# Patient Record
Sex: FEMALE | Race: WHITE
[De-identification: names, ages, dates, MRNs, and addresses within clinical notes are randomized per-mention and may not be internally consistent; named-entity substitution may affect disease eponyms.]

---

## 2018-02-28 ENCOUNTER — HOSPITAL ENCOUNTER (INPATIENT)
Dept: HOSPITAL 17 - NEPE | Age: 63
LOS: 8 days | Discharge: HOSPICE | DRG: 974 | End: 2018-03-08
Attending: HOSPITALIST | Admitting: INTERNAL MEDICINE
Payer: MEDICARE

## 2018-02-28 VITALS
DIASTOLIC BLOOD PRESSURE: 58 MMHG | RESPIRATION RATE: 27 BRPM | HEART RATE: 130 BPM | SYSTOLIC BLOOD PRESSURE: 113 MMHG | OXYGEN SATURATION: 94 %

## 2018-02-28 VITALS
RESPIRATION RATE: 23 BRPM | HEART RATE: 102 BPM | SYSTOLIC BLOOD PRESSURE: 101 MMHG | DIASTOLIC BLOOD PRESSURE: 62 MMHG | TEMPERATURE: 98.7 F | OXYGEN SATURATION: 96 %

## 2018-02-28 VITALS — BODY MASS INDEX: 29.21 KG/M2 | WEIGHT: 158.73 LBS | HEIGHT: 62 IN

## 2018-02-28 VITALS — OXYGEN SATURATION: 93 %

## 2018-02-28 VITALS
HEART RATE: 141 BPM | DIASTOLIC BLOOD PRESSURE: 80 MMHG | RESPIRATION RATE: 29 BRPM | SYSTOLIC BLOOD PRESSURE: 137 MMHG | TEMPERATURE: 100.7 F

## 2018-02-28 VITALS — OXYGEN SATURATION: 96 %

## 2018-02-28 VITALS — HEART RATE: 104 BPM

## 2018-02-28 VITALS
SYSTOLIC BLOOD PRESSURE: 107 MMHG | DIASTOLIC BLOOD PRESSURE: 61 MMHG | HEART RATE: 109 BPM | RESPIRATION RATE: 34 BRPM | OXYGEN SATURATION: 92 % | TEMPERATURE: 99.6 F

## 2018-02-28 VITALS — OXYGEN SATURATION: 97 %

## 2018-02-28 VITALS — OXYGEN SATURATION: 98 %

## 2018-02-28 VITALS — HEART RATE: 96 BPM

## 2018-02-28 VITALS — OXYGEN SATURATION: 95 %

## 2018-02-28 DIAGNOSIS — E87.0: ICD-10-CM

## 2018-02-28 DIAGNOSIS — I25.10: ICD-10-CM

## 2018-02-28 DIAGNOSIS — Z88.5: ICD-10-CM

## 2018-02-28 DIAGNOSIS — G62.9: ICD-10-CM

## 2018-02-28 DIAGNOSIS — B19.20: ICD-10-CM

## 2018-02-28 DIAGNOSIS — I69.351: ICD-10-CM

## 2018-02-28 DIAGNOSIS — A41.9: Primary | ICD-10-CM

## 2018-02-28 DIAGNOSIS — I65.22: ICD-10-CM

## 2018-02-28 DIAGNOSIS — F32.9: ICD-10-CM

## 2018-02-28 DIAGNOSIS — Z85.3: ICD-10-CM

## 2018-02-28 DIAGNOSIS — I10: ICD-10-CM

## 2018-02-28 DIAGNOSIS — B20: ICD-10-CM

## 2018-02-28 DIAGNOSIS — J96.01: ICD-10-CM

## 2018-02-28 DIAGNOSIS — I25.2: ICD-10-CM

## 2018-02-28 DIAGNOSIS — J44.0: ICD-10-CM

## 2018-02-28 DIAGNOSIS — Z66: ICD-10-CM

## 2018-02-28 DIAGNOSIS — Z88.6: ICD-10-CM

## 2018-02-28 DIAGNOSIS — N17.9: ICD-10-CM

## 2018-02-28 DIAGNOSIS — E83.39: ICD-10-CM

## 2018-02-28 DIAGNOSIS — R65.20: ICD-10-CM

## 2018-02-28 DIAGNOSIS — J11.08: ICD-10-CM

## 2018-02-28 DIAGNOSIS — G93.41: ICD-10-CM

## 2018-02-28 DIAGNOSIS — E11.9: ICD-10-CM

## 2018-02-28 DIAGNOSIS — E66.01: ICD-10-CM

## 2018-02-28 DIAGNOSIS — I48.2: ICD-10-CM

## 2018-02-28 DIAGNOSIS — E78.5: ICD-10-CM

## 2018-02-28 DIAGNOSIS — I69.320: ICD-10-CM

## 2018-02-28 DIAGNOSIS — J15.9: ICD-10-CM

## 2018-02-28 DIAGNOSIS — Z51.5: ICD-10-CM

## 2018-02-28 DIAGNOSIS — E87.6: ICD-10-CM

## 2018-02-28 DIAGNOSIS — J44.1: ICD-10-CM
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
## 2018-02-28 DIAGNOSIS — F17.210: ICD-10-CM

## 2018-02-28 LAB
ALBUMIN SERPL-MCNC: 2.8 GM/DL (ref 3.4–5)
ALP SERPL-CCNC: 109 U/L (ref 45–117)
ALT SERPL-CCNC: 177 U/L (ref 10–53)
AST SERPL-CCNC: 129 U/L (ref 15–37)
BASOPHILS # BLD AUTO: 0 TH/MM3 (ref 0–0.2)
BASOPHILS NFR BLD: 0.3 % (ref 0–2)
BILIRUB SERPL-MCNC: 0.6 MG/DL (ref 0.2–1)
BUN SERPL-MCNC: 46 MG/DL (ref 7–18)
CALCIUM SERPL-MCNC: 8.2 MG/DL (ref 8.5–10.1)
CHLORIDE SERPL-SCNC: 109 MEQ/L (ref 98–107)
CREAT SERPL-MCNC: 1.48 MG/DL (ref 0.5–1)
EOSINOPHIL # BLD: 0 TH/MM3 (ref 0–0.4)
EOSINOPHIL NFR BLD: 0.2 % (ref 0–4)
ERYTHROCYTE [DISTWIDTH] IN BLOOD BY AUTOMATED COUNT: 17.2 % (ref 11.6–17.2)
GFR SERPLBLD BASED ON 1.73 SQ M-ARVRAT: 35 ML/MIN (ref 89–?)
GLUCOSE SERPL-MCNC: 162 MG/DL (ref 74–106)
HCO3 BLD-SCNC: 16.8 MEQ/L (ref 21–32)
HCT VFR BLD CALC: 44.3 % (ref 35–46)
HGB BLD-MCNC: 15.3 GM/DL (ref 11.6–15.3)
LYMPHOCYTES # BLD AUTO: 0.8 TH/MM3 (ref 1–4.8)
LYMPHOCYTES NFR BLD AUTO: 6.7 % (ref 9–44)
LYMPHOCYTES: 8 % (ref 9–44)
MAGNESIUM SERPL-MCNC: 1.2 MG/DL (ref 1.5–2.5)
MCH RBC QN AUTO: 32.1 PG (ref 27–34)
MCHC RBC AUTO-ENTMCNC: 34.4 % (ref 32–36)
MCV RBC AUTO: 93.1 FL (ref 80–100)
MONOCYTE #: 0.6 TH/MM3 (ref 0–0.9)
MONOCYTES NFR BLD: 5.2 % (ref 0–8)
MONOCYTES: 4 % (ref 0–8)
NEUTROPHILS # BLD AUTO: 10.7 TH/MM3 (ref 1.8–7.7)
NEUTROPHILS NFR BLD AUTO: 87.6 % (ref 16–70)
NEUTS BAND # BLD MANUAL: 10.7 TH/MM3 (ref 1.8–7.7)
NEUTS BAND NFR BLD: 18 % (ref 0–6)
NEUTS SEG NFR BLD MANUAL: 70 % (ref 16–70)
PLATELET # BLD: 170 TH/MM3 (ref 150–450)
PMV BLD AUTO: 11.5 FL (ref 7–11)
PROT SERPL-MCNC: 7.6 GM/DL (ref 6.4–8.2)
RBC # BLD AUTO: 4.76 MIL/MM3 (ref 4–5.3)
SODIUM SERPL-SCNC: 137 MEQ/L (ref 136–145)
TROPONIN I SERPL-MCNC: 0.03 NG/ML (ref 0.02–0.05)
WBC # BLD AUTO: 12.2 TH/MM3 (ref 4–11)
WBC TOXIC VACUOLES BLD QL SMEAR: PRESENT

## 2018-02-28 PROCEDURE — 36600 WITHDRAWAL OF ARTERIAL BLOOD: CPT

## 2018-02-28 PROCEDURE — 5A09557 ASSISTANCE WITH RESPIRATORY VENTILATION, GREATER THAN 96 CONSECUTIVE HOURS, CONTINUOUS POSITIVE AIRWAY PRESSURE: ICD-10-PCS | Performed by: EMERGENCY MEDICINE

## 2018-02-28 PROCEDURE — 96365 THER/PROPH/DIAG IV INF INIT: CPT

## 2018-02-28 PROCEDURE — 80053 COMPREHEN METABOLIC PANEL: CPT

## 2018-02-28 PROCEDURE — 85027 COMPLETE CBC AUTOMATED: CPT

## 2018-02-28 PROCEDURE — 83605 ASSAY OF LACTIC ACID: CPT

## 2018-02-28 PROCEDURE — 93005 ELECTROCARDIOGRAM TRACING: CPT

## 2018-02-28 PROCEDURE — 86355 B CELLS TOTAL COUNT: CPT

## 2018-02-28 PROCEDURE — 86359 T CELLS TOTAL COUNT: CPT

## 2018-02-28 PROCEDURE — 85025 COMPLETE CBC W/AUTO DIFF WBC: CPT

## 2018-02-28 PROCEDURE — 94003 VENT MGMT INPAT SUBQ DAY: CPT

## 2018-02-28 PROCEDURE — 83735 ASSAY OF MAGNESIUM: CPT

## 2018-02-28 PROCEDURE — 87040 BLOOD CULTURE FOR BACTERIA: CPT

## 2018-02-28 PROCEDURE — 86357 NK CELLS TOTAL COUNT: CPT

## 2018-02-28 PROCEDURE — 86360 T CELL ABSOLUTE COUNT/RATIO: CPT

## 2018-02-28 PROCEDURE — 94640 AIRWAY INHALATION TREATMENT: CPT

## 2018-02-28 PROCEDURE — 71250 CT THORAX DX C-: CPT

## 2018-02-28 PROCEDURE — 71045 X-RAY EXAM CHEST 1 VIEW: CPT

## 2018-02-28 PROCEDURE — 84484 ASSAY OF TROPONIN QUANT: CPT

## 2018-02-28 PROCEDURE — 94002 VENT MGMT INPAT INIT DAY: CPT

## 2018-02-28 PROCEDURE — 82948 REAGENT STRIP/BLOOD GLUCOSE: CPT

## 2018-02-28 PROCEDURE — 82805 BLOOD GASES W/O2 SATURATION: CPT

## 2018-02-28 PROCEDURE — 96375 TX/PRO/DX INJ NEW DRUG ADDON: CPT

## 2018-02-28 PROCEDURE — 76705 ECHO EXAM OF ABDOMEN: CPT

## 2018-02-28 PROCEDURE — 84100 ASSAY OF PHOSPHORUS: CPT

## 2018-02-28 PROCEDURE — 86403 PARTICLE AGGLUT ANTBDY SCRN: CPT

## 2018-02-28 PROCEDURE — 87449 NOS EACH ORGANISM AG IA: CPT

## 2018-02-28 PROCEDURE — 99292 CRITICAL CARE ADDL 30 MIN: CPT

## 2018-02-28 PROCEDURE — 87077 CULTURE AEROBIC IDENTIFY: CPT

## 2018-02-28 PROCEDURE — 87641 MR-STAPH DNA AMP PROBE: CPT

## 2018-02-28 PROCEDURE — 80048 BASIC METABOLIC PNL TOTAL CA: CPT

## 2018-02-28 PROCEDURE — 87186 SC STD MICRODIL/AGAR DIL: CPT

## 2018-02-28 PROCEDURE — 81001 URINALYSIS AUTO W/SCOPE: CPT

## 2018-02-28 PROCEDURE — 87205 SMEAR GRAM STAIN: CPT

## 2018-02-28 PROCEDURE — 85007 BL SMEAR W/DIFF WBC COUNT: CPT

## 2018-02-28 PROCEDURE — 83880 ASSAY OF NATRIURETIC PEPTIDE: CPT

## 2018-02-28 PROCEDURE — 84439 ASSAY OF FREE THYROXINE: CPT

## 2018-02-28 PROCEDURE — 82550 ASSAY OF CK (CPK): CPT

## 2018-02-28 PROCEDURE — 94664 DEMO&/EVAL PT USE INHALER: CPT

## 2018-02-28 PROCEDURE — 76937 US GUIDE VASCULAR ACCESS: CPT

## 2018-02-28 PROCEDURE — 84443 ASSAY THYROID STIM HORMONE: CPT

## 2018-02-28 PROCEDURE — 93306 TTE W/DOPPLER COMPLETE: CPT

## 2018-02-28 RX ADMIN — Medication SCH ML: at 16:14

## 2018-02-28 RX ADMIN — ENOXAPARIN SODIUM SCH MG: 30 INJECTION SUBCUTANEOUS at 16:14

## 2018-02-28 RX ADMIN — TAZOBACTAM SODIUM AND PIPERACILLIN SODIUM SCH MLS/HR: 375; 3 INJECTION, SOLUTION INTRAVENOUS at 22:03

## 2018-02-28 RX ADMIN — OSELTAMIVIR PHOSPHATE SCH MG: 75 CAPSULE ORAL at 21:00

## 2018-02-28 RX ADMIN — CARVEDILOL SCH MG: 12.5 TABLET, FILM COATED ORAL at 21:56

## 2018-02-28 RX ADMIN — IPRATROPIUM BROMIDE AND ALBUTEROL SULFATE SCH AMPULE: .5; 3 SOLUTION RESPIRATORY (INHALATION) at 10:45

## 2018-02-28 RX ADMIN — CHLORHEXIDINE GLUCONATE 0.12% ORAL RINSE SCH ML: 1.2 LIQUID ORAL at 20:00

## 2018-02-28 RX ADMIN — Medication SCH ML: at 21:56

## 2018-02-28 RX ADMIN — INSULIN ASPART SCH: 100 INJECTION, SOLUTION INTRAVENOUS; SUBCUTANEOUS at 21:54

## 2018-02-28 RX ADMIN — IPRATROPIUM BROMIDE AND ALBUTEROL SULFATE SCH AMPULE: .5; 3 SOLUTION RESPIRATORY (INHALATION) at 19:08

## 2018-02-28 RX ADMIN — TAZOBACTAM SODIUM AND PIPERACILLIN SODIUM SCH MLS/HR: 375; 3 INJECTION, SOLUTION INTRAVENOUS at 16:52

## 2018-02-28 RX ADMIN — IPRATROPIUM BROMIDE AND ALBUTEROL SULFATE SCH AMPULE: .5; 3 SOLUTION RESPIRATORY (INHALATION) at 10:54

## 2018-02-28 RX ADMIN — IPRATROPIUM BROMIDE AND ALBUTEROL SULFATE SCH AMPULE: .5; 3 SOLUTION RESPIRATORY (INHALATION) at 23:12

## 2018-02-28 RX ADMIN — LINEZOLID SCH MLS/HR: 600 INJECTION, SOLUTION INTRAVENOUS at 16:14

## 2018-02-28 RX ADMIN — FAMOTIDINE SCH MG: 10 INJECTION, SOLUTION INTRAVENOUS at 16:14

## 2018-02-28 RX ADMIN — IPRATROPIUM BROMIDE AND ALBUTEROL SULFATE SCH AMPULE: .5; 3 SOLUTION RESPIRATORY (INHALATION) at 15:29

## 2018-02-28 RX ADMIN — MONTELUKAST SODIUM SCH MG: 10 TABLET, COATED ORAL at 21:55

## 2018-02-28 RX ADMIN — ATORVASTATIN CALCIUM SCH MG: 80 TABLET, FILM COATED ORAL at 21:56

## 2018-02-28 RX ADMIN — IPRATROPIUM BROMIDE AND ALBUTEROL SULFATE SCH AMPULE: .5; 3 SOLUTION RESPIRATORY (INHALATION) at 10:55

## 2018-02-28 RX ADMIN — INSULIN ASPART SCH: 100 INJECTION, SOLUTION INTRAVENOUS; SUBCUTANEOUS at 16:38

## 2018-02-28 RX ADMIN — AMIODARONE HYDROCHLORIDE SCH MG: 200 TABLET ORAL at 21:54

## 2018-02-28 RX ADMIN — PHENYTOIN SODIUM SCH MLS/HR: 50 INJECTION INTRAMUSCULAR; INTRAVENOUS at 16:29

## 2018-02-28 NOTE — RADRPT
EXAM DATE/TIME:  02/28/2018 10:43 

 

[facility] COMPARISON:     

CHEST SINGLE AP, January 12, 2016, 14:22.

 

                     

INDICATIONS :     

Shortness of breath.

                     

 

MEDICAL HISTORY :     

Hypertension.  Myocardial infarction.  Stroke.   Afib. Diabetes.    

 

SURGICAL HISTORY :     

None.   

 

ENCOUNTER:     

Initial                                        

 

ACUITY:     

1 day      

 

PAIN SCORE:     

0/10

 

LOCATION:     

Bilateral chest 

 

FINDINGS:     

Cardiomegaly with mild interstitial edema without consolidation or pleural effusion.  No pneumothorax
. The portion of the bony skeleton visualized is unremarkable.

 

 

CONCLUSION:     

Mild to moderate congestive failure when compared to 1/12/16

 

 

 

 Lito Salomon MD FACR on February 28, 2018 at 10:56           

Board Certified Radiologist.

 This report was verified electronically.

## 2018-02-28 NOTE — RADRPT
EXAM DATE/TIME:  02/28/2018 13:27 

 

[facility] COMPARISON:     

No previous studies available for comparison.

 

 

INDICATIONS :     

Shortness of breath. 

                      

 

RADIATION DOSE:     

18.63 CTDIvol (mGy) 

 

 

MEDICAL HISTORY :     

Cardiovascular disease. Hypertension.  Asthma

 

SURGICAL HISTORY :      

Hysterectomy.  Shoulder surgery

 

ENCOUNTER:      

Initial

 

ACUITY:      

1 day

 

PAIN SCALE:      

0/10

 

LOCATION:        

chest 

 

TECHNIQUE:      

Volumetric scanning of the chest was performed.  Using automated exposure control and adjustment of t
he mA and/or kV according to patient size, radiation dose was kept as low as reasonably achievable to
 obtain optimal diagnostic quality images.  DICOM format image data is available electronically for r
eview and comparison.  

 

Follow-up recommendations for detected pulmonary nodules are based at a minimum on nodule size and pa
tient risk factors according to Fleischner Society Guidelines.

 

FINDINGS:     

There is patchy airspace disease in both the right and left lungs with groundglass opacity and scatte
red areas of consolidation.

There is compensated cardiomegaly with extensive vascular calcification including the coronaries.  Th
ere is no pericardial effusion.

There is no significant pleural effusion

Small 1.4 cm left adrenal nodule is noted.

Upper abdominal contents otherwise unremarkable.

 

CONCLUSION:     

Patchy airspace disease scattered consolidation both lungs.  No pleural effusion.

 

 

 

 

 Lito Salomon MD FACR on February 28, 2018 at 13:37           

Board Certified Radiologist.

 This report was verified electronically.

## 2018-02-28 NOTE — PD
HPI


Chief Complaint:  Respiratory Distress


Time Seen by Provider:  10:33


Travel History


International Travel<30 days:  No


Contact w/Intl Traveler<30days:  No


Traveled to known affect area:  No





History of Present Illness


HPI


The patient was seen and examined in the presence of the nurse.  This patient 

is sent in by ambulance from the nursing home in critical distress.  She is a 

DNR patient with HIV who is currently on day 5 of Tamiflu for influenza.  She 

is an active smoker at the facility.  She's been coughing up thick mucus.  She 

has a fever of 100.7 at this time.  Symptoms are severe.  Duration of symptoms 

1 week.  However much worse today.  No alleviating factors.  Symptoms 

exacerbated by her smoking and immunocompromise.





PFSH


Past Medical History


Hx Anticoagulant Therapy:  Yes


Arthritis:  No


Asthma:  Yes


Autoimmune Disease:  Yes (HIV +)


Blood Disorders:  No


Anxiety:  Yes


Depression:  Yes


Heart Rhythm Problems:  Yes (MURMUR AFIB)


Cancer:  No


Cardiovascular Problems:  Yes


High Cholesterol:  Yes


Chemotherapy:  No


Congestive Heart Failure:  No


COPD:  No


Cerebrovascular Accident:  Yes


Diabetes:  Yes


Endocrine:  No


Gastrointestinal Disorders:  Yes


GERD:  Yes


Glaucoma:  No


Genitourinary:  No


Headaches:  No


Hepatitis:  Yes (Hep-C)


Hiatal Hernia:  No


Hypertension:  Yes


Immune Disorder:  No


Implanted Vascular Access Dvce:  Yes


Kidney Stones:  No


Musculoskeletal:  Yes ("INJURIES FROM FREQUENT FALLS")


Neurologic:  Yes


Psychiatric:  Yes


Reproductive:  No


Respiratory:  Yes


Migraines:  No


Radiation Therapy:  No


Renal Failure:  No


Seizures:  No


Sickle Cell Disease:  No


Sleep Apnea:  No


Thyroid Disease:  No


Ulcer:  No


Pregnant?:  Not Pregnant





Past Surgical History


Abdominal Surgery:  No


AICD:  No


Body Medical Devices:  RODS IN BILATERAL LEGS FROM ORTHOPEDIC SURGERY AS PER 

SISTER


Cardiac Surgery:  No


Ear Surgery:  No


Endocrine Surgery:  No


Eye Surgery:  Yes (BILATERAL CATARACTS)


Genitourinary Surgery:  No


Gynecologic Surgery:  Yes


Hysterectomy:  Yes


Insulin Pump:  No


Joint Replacement:  No


Oral Surgery:  No


Pacemaker:  No


Thoracic Surgery:  Yes (L MASTECTOMY)


Other Surgery:  Yes (LUMPECTOMY)





Social History


Alcohol Use:  No


Tobacco Use:  No (1 PACK A DAY FOR 30 YEARS)


Substance Use:  No





Allergies-Medications


(Allergen,Severity, Reaction):  


Coded Allergies:  


     acetaminophen (Unverified  Allergy, Severe, GI UPSET, 8/15/17)


     hydrocodone (Unverified  Allergy, Severe, GI UPSET, 8/15/17)


Reported Meds & Prescriptions





Reported Meds & Active Scripts


Active


Xanax 0.25 Mg (Alprazolam) 0.25 Mg Tab 0.25 Mg PO Q8H PRN


Prozac (Fluoxetine HCl) 20 Mg Cap 40 Mg PO DAILY 


Sustiva (Efavirenz) 600 Mg Tab 600 Mg PO HS 


Ecotrin (Aspirin) 81 Mg Tabec 81 Mg PO DAILY 


Epzicom 600/300 (Abacavir/Lamivudine)  Tab 1 Tab PO DAILY 


Cardizem 60 mg tab (Diltiazem HCl) 60 Mg Tab 1 Tab PO QID 


Mag-Ox 400 Mg Tab (Magnesium Oxide) 400 Mg Tab 800 Mg PO TID 


Kcl 20 Meq Tab (Potassium Chloride) 20 Meq Tabcr 20 Meq PO DAILY 


Ultram (Tramadol HCl) 50 Mg Tab 50 Mg PO Q8H PRN


Nexium 24Hr (Esomeprazole Magnesium) 20 Mg Cap 20 Mg PO DAILY 


Crestor (Rosuvastatin Calcium) 40 Mg Tab 40 Mg PO HS 


Cordarone 200 Mg Tab (Amiodarone HCl) 200 Mg Tab 300 Mg PO Q12HR 


Coreg 12.5 mg (Carvedilol) 12.5 Mg Tab 1 Tab PO BID 


Losartan Potassium 50 Mg Tab 50 Mg PO BID 


Hydrodiuril (Hydrochlorothiazide) 25 Mg Tab 25 Mg PO DAILY 


Plavix (Clopidogrel Bisulfate) 75 Mg Tab 75 Mg PO DAILY 


Neurontin (Gabapentin) 800 Mg Tab 800 Mg PO BID 


Singulair (Montelukast Sodium) 10 Mg Tab 10 Mg PO HS 


Reported


Ambien 10 Mg Tab (Zolpidem Tartrate) 10 Mg Tab 10 Mg PO HS 








Review of Systems


General / Constitutional:  Positive: Fever, Chills


Eyes:  No: Visual changes


HENT:  Positive: Congestion, No: Headaches


Cardiovascular:  Positive: Tachycardia, No: Chest Pain or Discomfort


Respiratory:  Positive: Cough, Shortness of Breath, Wheezing


Gastrointestinal:  No: Abdominal Pain


Genitourinary:  No: Dysuria


Musculoskeletal:  No: Pain


Skin:  No Rash


Neurologic:  No: Weakness


Psychiatric:  No: Depression


Endocrine:  No: Polydipsia


Hematologic/Lymphatic:  No: Easy Bruising





Physical Exam


Narrative


GENERAL: Obese 63-year-old woman in profound respiratory distress.


SKIN: Focused skin assessment reveals no rash and nodules. Skin is Warm and dry.


HEAD: Atraumatic. Normocephalic. 


EYES: Pupils equal and round. No scleral icterus. No injection or drainage. 


ENT: No nasal bleeding or discharge.  Mucous membranes pink and moist.


NECK: Trachea midline. No JVD. 


CARDIOVASCULAR: Regular rate and rhythm.  No murmur appreciated.  Tachycardic 

at 140


RESPIRATORY: Positive accessory muscle use.  Diffuse expiratory wheezing and 

rhonchi . Breath sounds equal bilaterally.  Respiratory rate is elevated


GASTROINTESTINAL: Abdomen soft, non-tender, nondistended. Hepatic and splenic 

margins not palpable. 


MUSCULOSKELETAL: No obvious deformities. No clubbing.  No cyanosis.  No edema. 


NEUROLOGICAL: Awake and alert. No obvious cranial nerve deficits.  Motor 

grossly within normal limits. Normal speech.


PSYCHIATRIC: Appropriate mood and affect; insight and judgment poor .





Data


Data


Last Documented VS





Vital Signs








  Date Time  Temp Pulse Resp B/P (MAP) Pulse Ox O2 Delivery O2 Flow Rate FiO2


 


2/28/18 11:42     95   70


 


2/28/18 11:20  130 27 113/58 (76)  BiPAP  


 


2/28/18 10:31       8.00 


 


2/28/18 10:31 100.7       








Orders





 Orders


Complete Blood Count With Diff (2/28/18 10:33)


Comprehensive Metabolic Panel (2/28/18 10:33)


Blood Culture (2/28/18 10:33)


Iv Access Insert/Monitor (2/28/18 10:33)


Electrocardiogram (2/28/18 10:33)


Ecg Monitoring (2/28/18 10:33)


Oximetry (2/28/18 10:33)


Oxygen Administration (2/28/18 10:33)


Chest, Single Ap (2/28/18 10:33)


Sodium Chloride 0.9% Flush (Ns Flush) (2/28/18 10:45)


Methylprednisolone So Succ Inj (Solumedr (2/28/18 10:45)


Albuterol-Ipratropium Neb (Duoneb Neb) (2/28/18 10:45)


Resp Bipap / Cpap Non Invas Vt (2/28/18 10:33)


Piperacil-Tazo 3.375 Gm Premix (Zosyn 3. (2/28/18 10:45)


Lactic Acid (2/28/18 10:42)


Arterial Blood Gas (Abg) (2/28/18 )


B-Type Natriuretic Peptide (2/28/18 12:59)


Ct Thorax/ Chest Wo Iv Contras (2/28/18 )


Admit Order (Ed Use Only) (2/28/18 13:05)


Admit To Inpatient (2/28/18 )


Code Status (2/28/18 13:04)


Vital Signs (Adult) MIKKI.Q1H (2/28/18 13:04)


Activity Bed Rest (2/28/18 13:04)


Cardiac Monitor / Telemetry MIKKI.Q8H (2/28/18 13:04)


Intake + Output MIKKI.Q8H (2/28/18 13:04)


Diet Npo (2/28/18 Lunch)


Sodium Chlor 0.9% 1000 Ml Inj (Ns 1000 M (2/28/18 13:04)


Sodium Chloride 0.9% Flush (Ns Flush) (2/28/18 13:15)


Sodium Chloride 0.9% Flush (Ns Flush) (2/28/18 13:15)


Albuterol-Ipratropium Neb (Duoneb Neb) (2/28/18 16:00)


Albuterol-Ipratropium Neb (Duoneb Neb) (2/28/18 13:15)


Chlorhexidine 0.12% Liq (Peridex 0.12% L (2/28/18 20:00)


Famotidine Inj (Pepcid Inj) (2/28/18 13:15)


Complete Blood Count With Diff (3/1/18 06:00)


Comprehensive Metabolic Panel (3/1/18 06:00)


Troponin I (2/28/18 13:04)


Troponin I (2/28/18 19:04)


Magnesium (Mg) (2/28/18 13:04)


Sputum Culture And Gram Stain (2/28/18 13:04)


Ua Includes Microscopic (2/28/18 13:04)


Chest, Single Ap (3/1/18 06:00)


Resp Bipap / Cpap Non Invas Vt (2/28/18 )


Enoxaparin Inj (Lovenox Inj) (2/28/18 13:15)


^ Initiate Protocol (2/28/18 13:04)


Instruction (2/28/18 13:04)


Notify Md To Reorder (2/28/18 13:15)


Chlorhexidine 2% Cloth (Chlorhexidine 2% (3/1/18 04:00)


Chlorhexidine 2% Cloth (Chlorhexidine 2% (2/28/18 13:15)


Mrsa Pcr Surveillance (2/28/18 13:04)


Inpatient Certification (2/28/18 )


Piperacil-Tazo 4.5 Gm Premix (Zosyn 4.5 (2/28/18 13:15)


Linezolid 600 Mg Premix (Zyvox 600 Mg Pr (2/28/18 13:15)


Oseltamivir (Tamiflu) (2/28/18 21:00)


Consult Infectious Disease (2/28/18 )





Labs





Laboratory Tests








Test


  2/28/18


10:28 2/28/18


11:27


 


White Blood Count 12.2 TH/MM3  


 


Red Blood Count 4.76 MIL/MM3  


 


Hemoglobin 15.3 GM/DL  


 


Hematocrit 44.3 %  


 


Mean Corpuscular Volume 93.1 FL  


 


Mean Corpuscular Hemoglobin 32.1 PG  


 


Mean Corpuscular Hemoglobin


Concent 34.4 % 


  


 


 


Red Cell Distribution Width 17.2 %  


 


Platelet Count 170 TH/MM3  


 


Mean Platelet Volume 11.5 FL  


 


Neutrophils (%) (Auto) 87.6 %  


 


Lymphocytes (%) (Auto) 6.7 %  


 


Monocytes (%) (Auto) 5.2 %  


 


Eosinophils (%) (Auto) 0.2 %  


 


Basophils (%) (Auto) 0.3 %  


 


Neutrophils # (Auto) 10.7 TH/MM3  


 


Lymphocytes # (Auto) 0.8 TH/MM3  


 


Monocytes # (Auto) 0.6 TH/MM3  


 


Eosinophils # (Auto) 0.0 TH/MM3  


 


Basophils # (Auto) 0.0 TH/MM3  


 


CBC Comment AUTO DIFF  


 


Differential Total Cells


Counted 100 


  


 


 


Neutrophils % (Manual) 70 %  


 


Band Neutrophils % 18 %  


 


Lymphocytes % 8 %  


 


Monocytes % 4 %  


 


Neutrophils # (Manual) 10.7 TH/MM3  


 


Differential Comment


  FINAL DIFF


MANUAL 


 


 


Toxic Vacuolation PRESENT  


 


Platelet Estimate NORMAL  


 


Platelet Morphology Comment ENLARGED  


 


Blood Urea Nitrogen 46 MG/DL  


 


Creatinine 1.48 MG/DL  


 


Random Glucose 162 MG/DL  


 


Total Protein 7.6 GM/DL  


 


Albumin 2.8 GM/DL  


 


Calcium Level 8.2 MG/DL  


 


Alkaline Phosphatase 109 U/L  


 


Aspartate Amino Transf


(AST/SGOT) 129 U/L 


  


 


 


Alanine Aminotransferase


(ALT/SGPT) 177 U/L 


  


 


 


Total Bilirubin 0.6 MG/DL  


 


Sodium Level 137 MEQ/L  


 


Potassium Level 3.5 MEQ/L  


 


Chloride Level 109 MEQ/L  


 


Carbon Dioxide Level 16.8 MEQ/L  


 


Anion Gap 11 MEQ/L  


 


Estimat Glomerular Filtration


Rate 35 ML/MIN 


  


 


 


Lactic Acid Level 1.6 mmol/L  


 


Blood Gas Puncture Site  RT RADIAL 


 


Blood Gas Patient Temperature  98.6 


 


Blood Gas HCO3  15 mmol/L 


 


Blood Gas Base Excess  -9.5 mmol/L 


 


Blood Gas Oxygen Saturation  93 % 


 


Arterial Blood pH  7.36 


 


Arterial Blood Partial


Pressure CO2 


  27 mmHg 


 


 


Arterial Blood Partial


Pressure O2 


  72 mmHG 


 


 


Arterial Blood Oxygen Content  20.0 Vol % 


 


Arterial Blood


Carboxyhemoglobin 


  1.0 % 


 


 


Arterial Blood Methemoglobin  0.6 % 


 


Blood Gas Hemoglobin  15.3 G/DL 


 


Oxygen Delivery Device  BiPAP 


 


Blood Gas Ventilator Setting  IPAP15/EPAP5 


 


Blood Gas Inspired Oxygen  70 % 











MDM


Medical Decision Making


Medical Screen Exam Complete:  Yes


Emergency Medical Condition:  Yes


Medical Record Reviewed:  Yes


Differential Diagnosis


Respiratory failure, pneumonia, COPD


Narrative Course


I have reviewed the patient's electronic medical record.  Reviewed her nursing 

home paperwork.  She is a DNR.  Reviewed her labs from January 1035: I have evaluated the patient.  She is in critical respiratory distress.





IV placed


I gave her IV Solu-Medrol and IV Zosyn after 2 blood culture sets obtained


Labs sent


I've ordered an x-ray.  I've initiated BiPAP therapy


We'll honor her DNR and hold off on intubation





11:30: I reviewed her chest x-ray.  Radiologist reading is noted.  However she 

does not have any clinical suspicion of CHF.  No edema or diuretic use.  On the 

other hand she does have pneumonia symptomatology with fever and productive 

cough and immunocompromise





1145: CBC shows minor leukocytosis


Metabolic profile shows azotemia with elevated BUN and creatinine ratio


Get some IV fluid





She is in A. fib with RVR


Not hypotensive at this time





12:30: Another recheck was done.  I reviewed her ABG which shows significant 

hypoxemia.  However her workup breathing is much better on the BiPAP


Heart rate running in the 120s A. fib





1310: I reviewed with intensivist for admission


Patient is critically ill


Critical Care Narrative


Aggregate critical care time was 80 minutes. Time to perform other separately 

billable procedures was not included in the critical care time. My time did not 

include minutes spent treating any other patients simultaneously or on 

activities that did not directly contribute to the patient's treatment.  





The services I provided to this patient were to treat and/or prevent clinically 

significant deterioration that could result in: Cardiopulmonary arrest, 

hypoxemic brain injury, death





I provided critical care services requiring my management, as noted below:


Chart data review, documentation time, medication orders and management, vital 

sign assessments/reviewing monitor data, ordering and reviewing lab tests, 

ordering and interpreting/reviewing x-rays and diagnostic studies, care of the 

patient and discussion of the patient with the admitting physicians.





Sepsis Criteria


SIRS Criteria (2 or more):  Heart rate over 90, RR  > 20 or PaCO2 < 32


Sepsis Criteria (SIRS+source):  Infect source susp/known


Criteria Outcome:  Meets sepsis criteria





Diagnosis





 Primary Impression:  


 Acute hypoxemic respiratory failure


 Additional Impressions:  


 History of HIV infection


 Atrial fibrillation with RVR


 Azotemia





Admitting Information


Admitting Physician Requests:  Admit











Mik Menjivar MD Feb 28, 2018 10:42

## 2018-02-28 NOTE — RADRPT
EXAM DATE/TIME:  02/28/2018 13:48 

 

[facility] COMPARISON:     

CT THORAX W/O CONTRAST, February 28, 2018, 13:27.

 

[facility]PAR[facility] :    

[facility]

 

 

INDICATIONS :     

Abnormal labs.

                     

 

MEDICAL HISTORY :     

Hypercholesterolemia. Hepatitis C.   CVA. MI. Anticoagulant therapy. A.FIb. Murmur. Hypertension. Ast
hma. GERD. Diabetes. HIV.

 

SURGICAL HISTORY :     

Hysterectomy.     Cataracts removal. Left mastectomy. Bilateral leg surgery with hardware. 

 

ENCOUNTER:     

Initial

 

ACUITY:     

1 day

 

PAIN SCORE:     

0/10

 

LOCATION:         

Abdomen. 

                     

MEASUREMENTS:     

 

LIVER:     

19.9 cm length 

 

COMMON DUCT:     

6 mm

 

RIGHT KIDNEY:     

11.6 x 3.9  x 4.2  cm

 

SPLEEN:     

11.7 cm length

 

FINDINGS:     

 

LIVER:     

Mild increased echotexture without focal lesion or ductal dilatation.  Main portal vein is patent.

 

COMMON DUCT:     

No intraluminal mass or stone visualized.  

 

GALLBLADDER:     

There is a single stone visualized in the gallbladder. No wall thickening or pericholecystic fluid is
 present.

 

PANCREAS:     

The visualized portions are within normal limits.  

 

RIGHT KIDNEY:     

No hydronephrosis, stone or mass.  

 

SPLEEN:     

No focal lesion.  

 

CONCLUSION:     

1. Mild increased echotexture of the liver may indicate steatosis.

2. Cholelithiasis.

 

 

 

 Yg Rodarte MD on February 28, 2018 at 14:49           

Board Certified Radiologist.

 This report was verified electronically.

## 2018-02-28 NOTE — PD.ID.CON
History of Present Illness


Service


ID


Consult Requested By


 Dr Ronquillo


Reason for Consult


HIV , PNA


Primary Care Physician


Sid Mendoza MD


Diagnoses:  


History of Present Illness


64 yo female is a very poor historian


History obtained from the chart


She lives in [facility], pt of Dr Mendoza, + chronic tobaccoism, COPD, HIV dz (

pt of Dr Rose, on HAART: Truvadfa+ Norvir only reported on med rec), she 

dpoesnnot know her CD4


SHe presented with diagnosis of influenza in acute resp distress, cough


CT showed b/l patchy  infiltrates 


She is on zosyn, zyvox and tamiflu


She is afebrile with mildly elevated  WBC (12K)


ABG with incerease Aa gradient


MRSA nasal screen +


Lactic acid wnl


Blood clx are Pending


Sputum clx not available (pt not expectorationg)





Review of Systems


Except as stated in HPI:  all other systems reviewed are Neg





Past Family Social History


Allergies:  


Coded Allergies:  


     acetaminophen (Unverified  Allergy, Severe, GI UPSET, 8/15/17)


     hydrocodone (Unverified  Allergy, Severe, GI UPSET, 8/15/17)


Past Medical History


HIV


HCV


breast CA (L)


COPD tobaccoism


NSTEMI


AFib


MOrbid obesity


Past Surgical History


L  mastectomy


ORIF BLE


Active Ordered Medications


Medications where reviewed in EMR


Antibiotics Include:  tamiflu


zosyn


zyvox


Family History


reviewed


non contributory


Social History


+ tobacco


no ETOH


resides in [facility]





Physical Exam


Vital Signs





Vital Signs








  Date Time  Temp Pulse Resp B/P (MAP) Pulse Ox O2 Delivery O2 Flow Rate FiO2


 


2/28/18 19:12     97 BiPAP  50


 


2/28/18 19:11     97   50


 


2/28/18 18:00  104      


 


2/28/18 16:00  109      


 


2/28/18 16:00 99.6 109 34 107/61 (76) 92   


 


2/28/18 15:30     98 Non-Rebreather 12.00 


 


2/28/18 11:42     95   70


 


2/28/18 11:20  130 27 113/58 (76) 94 BiPAP  70


 


2/28/18 10:38     92 BiPAP  70


 


2/28/18 10:32     93   70


 


2/28/18 10:31     93 Nasal Cannula 8.00 


 


2/28/18 10:31 100.7 141 29 137/80 (99)  BiPAP  70








Physical Exam


CONSTITUTIONAL/GENERAL: This is a morbidly obese patient, in no apparent 

distress.


On BIPAP 50%, partial face mask


TUBES/LINES/DRAINS:


SKIN: No jaundice, rashes, or lesions.   Skin temperature appropriate. Not 

diaphoretic. 


BREASTS: mastectomy scar L breast - well healed


HEAD: Atraumatic. Normocephalic.


EYES: Pupils equal and round and reactive. Extraocular motions intact. No 

scleral icterus. No injection or drainage. Fundi not examined.


ENT: Hearing grossly normal. Nose without bleeding or purulent drainage. 

Limited mucosae exam without visible erythema, or lesions.





NECK: Trachea midline. Supple, nontender.  


CARDIOVASCULAR: Regular rate and rhythm without murmurs, gallops, or rubs. No 

JVD. Peripheral pulses symmetric. Perifery well perfused 


RESPIRATORY/CHEST: Symmetric, unlabored respirations. Diffuse b/l to 

auscultation. Breath sounds equal bilaterally.   


GASTROINTESTINAL: Abdomen soft, non-tender, nondistended. No hepato-splenomegaly

, or palpable masses. No guarding. Bowel sounds present.


GENITOURINARY: Without palpable bladder distension. Warner catheter in place.


MUSCULOSKELETAL: Extremities without clubbing, cyanosis, or edema. No joint 

tenderness or effusion noted. No calf tenderness. No mottling or clubbing.


LYMPHATICS: No palpable cervical or supraclavicular adenopathy.


NEUROLOGICAL: Awake and alert. Motor and sensory grossly within normal limits. 

Follows commands. Moves all extremities.


PSYCHIATRIC: No obvious anxiety/depression. no apparent hallucinations or other 

psychotic thought process.


Laboratory





Laboratory Tests








Test


  2/28/18


10:28 2/28/18


11:27 2/28/18


16:00


 


White Blood Count 12.2   


 


Red Blood Count 4.76   


 


Hemoglobin 15.3   


 


Hematocrit 44.3   


 


Mean Corpuscular Volume 93.1   


 


Mean Corpuscular Hemoglobin 32.1   


 


Mean Corpuscular Hemoglobin


Concent 34.4 


  


  


 


 


Red Cell Distribution Width 17.2   


 


Platelet Count 170   


 


Mean Platelet Volume 11.5   


 


Neutrophils (%) (Auto) 87.6   


 


Lymphocytes (%) (Auto) 6.7   


 


Monocytes (%) (Auto) 5.2   


 


Eosinophils (%) (Auto) 0.2   


 


Basophils (%) (Auto) 0.3   


 


Neutrophils # (Auto) 10.7   


 


Lymphocytes # (Auto) 0.8   


 


Monocytes # (Auto) 0.6   


 


Eosinophils # (Auto) 0.0   


 


Basophils # (Auto) 0.0   


 


CBC Comment AUTO DIFF   


 


Differential Total Cells


Counted 100 


  


  


 


 


Neutrophils % (Manual) 70   


 


Band Neutrophils % 18   


 


Lymphocytes % 8   


 


Monocytes % 4   


 


Neutrophils # (Manual) 10.7   


 


Differential Comment


  FINAL DIFF


MANUAL 


  


 


 


Toxic Vacuolation PRESENT   


 


Platelet Estimate NORMAL   


 


Platelet Morphology Comment ENLARGED   


 


Blood Urea Nitrogen 46   


 


Creatinine 1.48   


 


Random Glucose 162   


 


Total Protein 7.6   


 


Albumin 2.8   


 


Calcium Level 8.2   


 


Alkaline Phosphatase 109   


 


Aspartate Amino Transf


(AST/SGOT) 129 


  


  


 


 


Alanine Aminotransferase


(ALT/SGPT) 177 


  


  


 


 


Total Bilirubin 0.6   


 


Sodium Level 137   


 


Potassium Level 3.5   


 


Chloride Level 109   


 


Carbon Dioxide Level 16.8   


 


Anion Gap 11   


 


Estimat Glomerular Filtration


Rate 35 


  


  


 


 


Lactic Acid Level 1.6   


 


Troponin I 0.02   


 


B-Type Natriuretic Peptide 111   


 


Blood Gas Puncture Site  RT RADIAL  


 


Blood Gas Patient Temperature  98.6  


 


Blood Gas HCO3  15  


 


Blood Gas Base Excess  -9.5  


 


Blood Gas Oxygen Saturation  93  


 


Arterial Blood pH  7.36  


 


Arterial Blood Partial


Pressure CO2 


  27 


  


 


 


Arterial Blood Partial


Pressure O2 


  72 


  


 


 


Arterial Blood Oxygen Content  20.0  


 


Arterial Blood


Carboxyhemoglobin 


  1.0 


  


 


 


Arterial Blood Methemoglobin  0.6  


 


Blood Gas Hemoglobin  15.3  


 


Oxygen Delivery Device  BiPAP  


 


Blood Gas Ventilator Setting  IPAP15/EPAP5  


 


Blood Gas Inspired Oxygen  70  














 Date/Time


Source Procedure


Growth Status


 


 


 2/28/18 10:35


Blood Peripheral Aerobic Blood Culture


Pending Received


 


 2/28/18 10:35


Blood Peripheral Anaerobic Blood Culture


Pending Received








Result Diagram:  


2/28/18 1028                                                                   

             2/28/18 1028





Imaging





Last Impressions








Chest X-Ray 2/28/18 1033 Signed





Impressions: 





 Service Date/Time:  Wednesday, February 28, 2018 10:43 - CONCLUSION:  Mild to 





 moderate congestive failure when compared to 1/12/16     Lito Salomon MD  





 FACR


 


Liver Ultrasound 2/28/18 0000 Signed





Impressions: 





 Service Date/Time:  Wednesday, February 28, 2018 13:48 - CONCLUSION:  1. Mild 





 increased echotexture of the liver may indicate steatosis. 2. Cholelithiasis. 

   





  Yg Rodarte MD 


 


Chest CT 2/28/18 0000 Signed





Impressions: 





 Service Date/Time:  Wednesday, February 28, 2018 13:27 - CONCLUSION:  Patchy 





 airspace disease scattered consolidation both lungs.  No pleural effusion.    

  





 Lito Salomon MD  FACR











Assessment and Plan


Assessment and Plan


Influenza


 PNA


Respiratory insufficiency


HIV, unknown immunosuppression status


Morbid obesety


COPD


Chronic tobaccoism





   cont current abx


   cont tamiflu


   will need to review HAART wt provider (Dr Rose)


   sputum clx


   fu blood clx











Maria Teresa Hammer MD Feb 28, 2018 19:45

## 2018-02-28 NOTE — HHI.HP
\[facility]\""


Service


Critical Care Medicine


Primary Care Physician


Sid Mendoza MD


Admission Diagnosis





acute hypoxemic resp failure, HCAP,afib with rvr,DNR


Diagnosis:  


(1) Acute hypoxemic respiratory failure


Diagnosis:  Principal





(2) Acute exacerbation of chronic obstructive pulmonary disease (COPD)


Diagnosis:  Principal





(3) Severe sepsis


Diagnosis:  Principal





(4) Healthcare-associated pneumonia


Diagnosis:  Principal





(5) Bilateral multilobar pneumonia


Diagnosis:  Principal





(6) Influenza


Diagnosis:  Principal





(7) Acute kidney injury


Diagnosis:  Principal





(8) Atrial fibrillation with RVR


Diagnosis:  Principal





(9) Atrial fibrillation


Diagnosis:  Secondary





(10) Expressive aphasia


Diagnosis:  Secondary





(11) Depression


Diagnosis:  Secondary





(12) Hypertension


Diagnosis:  Secondary





(13) HLD (hyperlipidemia)


Diagnosis:  Secondary





(14) Left acute arterial ischemic stroke, MCA (middle cerebral artery)


Diagnosis:  Secondary





(15) History of diabetes mellitus


Diagnosis:  Secondary





(16) Left carotid stenosis


Diagnosis:  Secondary





Chief Complaint:  


Acute hypoxemic respiratory failure, multilobar pneumonia


Travel History


International Travel<30 Days:  No


Contact w/Intl Traveler <30 Da:  No


Traveled to Known Affected Are:  No





Sepsis Criteria


SIRS Criteria (2 or more):  Temp > 100.9 or < 96.8, Heart rate over 90, RR  > 

20 or PaCO2 < 32, WBC > 58189, < 4000 or > 10% bands


Sepsis Criteria (SIRS+source):  Infect source susp/known


Severe Sepsis (+one):  Organ Dysfunction, Acute Oliguria/Renal Failure


Criteria Outcome:  Meets severe sepsis criteria


History of Present Illness


Patient is a 63-year-old  female with history of CVA in the past with 

right hemiparesis, expressive aphasia, coronary artery disease, chronic atrial 

fibrillation, carotid stenosis, HIV on HAART who was brought in from NH for 

severe respiratory distress and hypoxemia. Currently DNR. Recently diagnosed 

with influenza pneumonia currently on day 5 of Tamiflu.  Fever of 100.7 in the 

ED. patient was in severe respiratory distress and hypoxemic immediately placed 

on BiPAP 70% oxygen.  Chest x-ray showed bibasilar infiltrate later confirmed 

with CT chest as multilobar infiltrate/pneumonia.  Patient received IV Solu-

Medrol and IV Zosyn after 2 blood culture obtained.  Patient also also found to 

be in A. fib with RVR with rates in 140s. CBC showed WBC count 12.2. BUN/cr was 

46/1.5 and was normal about a week ago.  Critical care medicine was requested 

to admit the patient.





I evaluated the patient in the ED.  She still appears to be in moderate 

respiratory distress but BiPAP had been removed per patient request.  

Significant respiratory distress and expressive aphasia limits exam.  Patient 

is breathing approximately 40 breaths per minute, bilateral wheezing and coarse 

crackles heard.  I have added scheduled Solu-Medrol, DuoNeb.  Tamiflu will be 

continued, continue Zosyn and add Zyvox. ID consulted. Continue BiPAP at .  

Prognosis is guarded at this time





Review of Systems


ROS Limitations:  Clinical Condition (resp failure on BiPAP)





Past Family Social History


Allergies:  


Coded Allergies:  


     acetaminophen (Unverified  Allergy, Severe, GI UPSET, 8/15/17)


     hydrocodone (Unverified  Allergy, Severe, GI UPSET, 8/15/17)


Past Medical History


Hepatitis C


HIV


Atrial fibrillation


Hyperlipidemia


Mild aortic stenosis


Multiple falls


Left MCA distribution stroke


Hypertension


Asthma


GERD


Past Surgical History


Left mastectomy


Lateral lower extremity orthopedic surgery.


Reported Medications


Xanax 0.25 Mg (Alprazolam) 0.25 Mg Tab 0.25 Mg PO Q8H PRN


Prozac (Fluoxetine HCl) 20 Mg Cap 40 Mg PO DAILY 


Sustiva (Efavirenz) 600 Mg Tab 600 Mg PO HS 


Ecotrin (Aspirin) 81 Mg Tabec 81 Mg PO DAILY 


Epzicom 600/300 (Abacavir/Lamivudine)  Tab 1 Tab PO DAILY 


Cardizem 60 mg tab (Diltiazem HCl) 60 Mg Tab 1 Tab PO QID 


Mag-Ox 400 Mg Tab (Magnesium Oxide) 400 Mg Tab 800 Mg PO TID 


Kcl 20 Meq Tab (Potassium Chloride) 20 Meq Tabcr 20 Meq PO DAILY 


Ultram (Tramadol HCl) 50 Mg Tab 50 Mg PO Q8H PRN


Nexium 24Hr (Esomeprazole Magnesium) 20 Mg Cap 20 Mg PO DAILY 


Crestor (Rosuvastatin Calcium) 40 Mg Tab 40 Mg PO HS 


Cordarone 200 Mg Tab (Amiodarone HCl) 200 Mg Tab 300 Mg PO Q12HR 


Coreg 12.5 mg (Carvedilol) 12.5 Mg Tab 1 Tab PO BID 


Losartan Potassium 50 Mg Tab 50 Mg PO BID 


Hydrodiuril (Hydrochlorothiazide) 25 Mg Tab 25 Mg PO DAILY 


Plavix (Clopidogrel Bisulfate) 75 Mg Tab 75 Mg PO DAILY 


Neurontin (Gabapentin) 800 Mg Tab 800 Mg PO BID 


Singulair (Montelukast Sodium) 10 Mg Tab 10 Mg PO HS 


Ambien 10 Mg Tab (Zolpidem Tartrate) 10 Mg Tab 10 Mg PO HS


Active Ordered Medications


Reviewed


Family History


Unable to obtain due to clinical condition


Social History


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





Physical Exam


Vital Signs





Vital Signs








  Date Time  Temp Pulse Resp B/P (MAP) Pulse Ox O2 Delivery O2 Flow Rate FiO2


 


18 11:42     95   70


 


18 11:20  130 27 113/58 (76) 94 BiPAP  70


 


18 10:38     92 BiPAP  70


 


18 10:32     93   70


 


18 10:31     93 Nasal Cannula 8.00 


 


18 10:31 100.7 141 29 137/80 (99)  BiPAP  70








Physical Exam


GENERAL: Obese 63-year-old woman in respiratory distress, currently on a 

nonrebreather


SKIN: Skin is Warm and dry.


HEAD: Atraumatic. Normocephalic. 


EYES: Pupils equal and round. No scleral icterus. 


ENT: No nasal bleeding or discharge.  Mucous membranes dry


NECK: Trachea midline. No JVD. 


CARDIOVASCULAR: No murmur appreciated.  Tachycardic at 130-140.  Monitor shows 

atrial fibrillation with RVR


RESPIRATORY: Positive accessory muscle use.  Diffuse expiratory wheezing and 

rhonchi .  Tachypneic


GASTROINTESTINAL: Abdomen soft, non-tender, nondistended. Hepatic and splenic 

margins not palpable. 


MUSCULOSKELETAL: No obvious deformities. No edema. 


NEUROLOGICAL: Awake and alert.  Speech is incomprehensible, history of 

expressive aphasia.  Limited exam due to respiratory distress but appears to 

have right hemiparesis


Laboratory





Laboratory Tests








Test


  18


10:28 18


11:27


 


White Blood Count 12.2  


 


Red Blood Count 4.76  


 


Hemoglobin 15.3  


 


Hematocrit 44.3  


 


Mean Corpuscular Volume 93.1  


 


Mean Corpuscular Hemoglobin 32.1  


 


Mean Corpuscular Hemoglobin


Concent 34.4 


  


 


 


Red Cell Distribution Width 17.2  


 


Platelet Count 170  


 


Mean Platelet Volume 11.5  


 


Neutrophils (%) (Auto) 87.6  


 


Lymphocytes (%) (Auto) 6.7  


 


Monocytes (%) (Auto) 5.2  


 


Eosinophils (%) (Auto) 0.2  


 


Basophils (%) (Auto) 0.3  


 


Neutrophils # (Auto) 10.7  


 


Lymphocytes # (Auto) 0.8  


 


Monocytes # (Auto) 0.6  


 


Eosinophils # (Auto) 0.0  


 


Basophils # (Auto) 0.0  


 


CBC Comment AUTO DIFF  


 


Differential Total Cells


Counted 100 


  


 


 


Neutrophils % (Manual) 70  


 


Band Neutrophils % 18  


 


Lymphocytes % 8  


 


Monocytes % 4  


 


Neutrophils # (Manual) 10.7  


 


Differential Comment


  FINAL DIFF


MANUAL 


 


 


Toxic Vacuolation PRESENT  


 


Platelet Estimate NORMAL  


 


Platelet Morphology Comment ENLARGED  


 


Blood Urea Nitrogen 46  


 


Creatinine 1.48  


 


Random Glucose 162  


 


Total Protein 7.6  


 


Albumin 2.8  


 


Calcium Level 8.2  


 


Alkaline Phosphatase 109  


 


Aspartate Amino Transf


(AST/SGOT) 129 


  


 


 


Alanine Aminotransferase


(ALT/SGPT) 177 


  


 


 


Total Bilirubin 0.6  


 


Sodium Level 137  


 


Potassium Level 3.5  


 


Chloride Level 109  


 


Carbon Dioxide Level 16.8  


 


Anion Gap 11  


 


Estimat Glomerular Filtration


Rate 35 


  


 


 


Lactic Acid Level 1.6  


 


Blood Gas Puncture Site  RT RADIAL 


 


Blood Gas Patient Temperature  98.6 


 


Blood Gas HCO3  15 


 


Blood Gas Base Excess  -9.5 


 


Blood Gas Oxygen Saturation  93 


 


Arterial Blood pH  7.36 


 


Arterial Blood Partial


Pressure CO2 


  27 


 


 


Arterial Blood Partial


Pressure O2 


  72 


 


 


Arterial Blood Oxygen Content  20.0 


 


Arterial Blood


Carboxyhemoglobin 


  1.0 


 


 


Arterial Blood Methemoglobin  0.6 


 


Blood Gas Hemoglobin  15.3 


 


Oxygen Delivery Device  BiPAP 


 


Blood Gas Ventilator Setting  IPAP15/EPAP5 


 


Blood Gas Inspired Oxygen  70 














 Date/Time


Source Procedure


Growth Status


 


 


 18 10:35


Blood Peripheral Aerobic Blood Culture


Pending Received


 


 18 10:35


Blood Peripheral Anaerobic Blood Culture


Pending Received








Result Diagram:  


18 1028                                                                   

             18 1028





Imaging


CXR shows bilateral infiltrate


CT chest bilateral infiltrate involving multiple lobes





Septic Shock Reassessment


Septic shock perfusion:  reassessment completed





Caprini VTE Risk Assessment


Caprini VTE Risk Assessment:  Mod/High Risk (score >= 2)


Caprini Risk Assessment Model











 Point Value = 1          Point Value = 2  Point Value = 3  Point Value = 5


 


Age 41-60


Minor surgery


BMI > 25 kg/m2


Swollen legs


Varicose veins


Pregnancy or postpartum


History of unexplained or recurrent


   spontaneous 


Oral contraceptives or hormone


   replacement


Sepsis (< 1 month)


Serious lung disease, including


   pneumonia (< 1 month)


Abnormal pulmonary function


Acute myocardial infarction


Congestive heart failure (< 1 month)


History of inflammatory bowel disease


Medical patient at bed rest Age 61-74


Arthroscopic surgery


Major open surgery (> 45 min)


Laparoscopic surgery (> 45 min)


Malignancy


Confined to bed (> 72 hours)


Immobilizing plaster cast


Central venous access Age >= 75


History of VTE


Family history of VTE


Factor V Leiden


Prothrombin [id number]


Lupus anticoagulant


Anticardiolipin antibodies


Elevated serum homocysteine


Heparin-induced thrombocytopenia


Other congenital or acquired


   thrombophilia Stroke (< 1 month)


Elective arthroplasty


Hip, pelvis, or leg fracture


Acute spinal cord injury (< 1 month)








Prophylaxis Regimen











   Total Risk


Factor Score Risk Level Prophylaxis Regimen


 


0-1      Low Early ambulation


 


2 Moderate Order ONE of the following:


*Sequential Compression Device (SCD)


*Heparin 5000 units SQ BID


 


3-4 Higher Order ONE of the following medications:


*Heparin 5000 units SQ TID


*Enoxaparin/Lovenox 40 mg SQ daily (WT < 150 kg, CrCl > 30 mL/min)


*Enoxaparin/Lovenox 30 mg SQ daily (WT < 150 kg, CrCl > 10-29 mL/min)


*Enoxaparin/Lovenox 30 mg SQ BID (WT < 150 kg, CrCl > 30 mL/min)


AND/OR


*Sequential Compression Device (SCD)


 


5 or more Highest Order ONE of the following medications:


*Heparin 5000 units SQ TID (Preferred with Epidurals)


*Enoxaparin/Lovenox 40 mg SQ daily (WT < 150 kg, CrCl > 30 mL/min)


*Enoxaparin/Lovenox 30 mg SQ daily (WT < 150 kg, CrCl > 10-29 mL/min)


*Enoxaparin/Lovenox 30 mg SQ BID (WT < 150 kg, CrCl > 30 mL/min)


AND


*Sequential Compression Device (SCD)











Assessment and Plan


Assessment and Plan


Neuro/Psych:


History of left MCA stroke with right hemiparesis


Depression


Peripheral neuropathy


Continue aspirin/Plavix


PT/OT once stabilized respiratory wise


Hold as needed Xanax, Prozac, Neurontin, Ambien





CV: 


Atrial fibrillation with rapid ventricular response


CAD with History of non-STEMI


Dyslipidemia


Left carotid stenosis


History of hypertension


Chronic A. fib


Start Cardizem infusion for rate control


Continue Home medications Coreg and amiodarone, hold p.o. Cardizem, and losartan


Continue aspirin Plavix


Check 2 D Echo. Echocardiogram in  EF around 50%.





Resp: 


Acute hypoxemic respiratory failure


Multilobar pneumonia recent influenza


Acute COPD exacerbation


BiPAP 10/5 with 70% FiO2


Fluid resuscitation with 1 L normal saline and 75 mL/h


IV Solu-Medrol 125 mg 1 and 60 mg every 8 hours


DuoNeb every 4 hours scheduled and as needed


Broad-spectrum antibiotics with Zosyn, Zyvox, Tamiflu





GI: 


History of hepatitis C


History diverticulosis


Elevated liver enzymes


NPO except meds


IV Famotidine


Check liver and gallbladder ultrasound





:  


Acute kidney injury


Warner will be placed for accurate I's and O's in a critically ill patient


IVF as above





Endo:  


Diabetes mellitus


Place on sliding scale insulin.  Accu-Cheks to maintain euglycemia





Heme:


Monitor CBC daily.





ID:


Bilateral pneumonia healthcare associated


Reason influenza


HIV


Placed on Zosyn and Zyvox, and continue Tamiflu for influenza with superadded 

bacterial pneumonia


Continue HAART


Consult ID





ENDO:


Hypokalemia


Replace electrolytes as clinically indicated for electrolyte protocol





Access


Utilized peripheral IVs





Prophylaxis


GI - Protonix


DVT - SCD/Lovenox subcutaneous





Critical Care 45 MIN





Patient is critically ill in severe hypoxemic respiratory failure, due to DNR 

status unable to intubate. Continue BIPAP, IV Steroids, breathing treatment and 

broad spectrum antibiotics. Prognosis guarded


Code Status


DNR


Discussed Condition With


Dr. Kaye





Problem Qualifiers





(1) Atrial fibrillation:  


Qualified Codes:  I48.91 - Unspecified atrial fibrillation


(2) Depression:  


Qualified Codes:  F32.9 - Major depressive disorder, single episode, unspecified








Raad Ronquillo MD 2018 13:31

## 2018-03-01 VITALS
TEMPERATURE: 98.6 F | DIASTOLIC BLOOD PRESSURE: 56 MMHG | RESPIRATION RATE: 22 BRPM | SYSTOLIC BLOOD PRESSURE: 102 MMHG | HEART RATE: 78 BPM | OXYGEN SATURATION: 94 %

## 2018-03-01 VITALS
OXYGEN SATURATION: 96 % | SYSTOLIC BLOOD PRESSURE: 137 MMHG | TEMPERATURE: 97.6 F | RESPIRATION RATE: 16 BRPM | HEART RATE: 79 BPM | DIASTOLIC BLOOD PRESSURE: 69 MMHG

## 2018-03-01 VITALS — HEART RATE: 85 BPM

## 2018-03-01 VITALS
TEMPERATURE: 97.7 F | SYSTOLIC BLOOD PRESSURE: 110 MMHG | HEART RATE: 85 BPM | OXYGEN SATURATION: 93 % | DIASTOLIC BLOOD PRESSURE: 68 MMHG | RESPIRATION RATE: 25 BRPM

## 2018-03-01 VITALS
SYSTOLIC BLOOD PRESSURE: 113 MMHG | OXYGEN SATURATION: 96 % | TEMPERATURE: 98.6 F | HEART RATE: 86 BPM | RESPIRATION RATE: 22 BRPM | DIASTOLIC BLOOD PRESSURE: 57 MMHG

## 2018-03-01 VITALS
RESPIRATION RATE: 25 BRPM | DIASTOLIC BLOOD PRESSURE: 68 MMHG | OXYGEN SATURATION: 94 % | HEART RATE: 80 BPM | SYSTOLIC BLOOD PRESSURE: 127 MMHG | TEMPERATURE: 97.7 F

## 2018-03-01 VITALS — HEART RATE: 78 BPM

## 2018-03-01 VITALS
TEMPERATURE: 97.8 F | RESPIRATION RATE: 27 BRPM | SYSTOLIC BLOOD PRESSURE: 112 MMHG | HEART RATE: 84 BPM | DIASTOLIC BLOOD PRESSURE: 71 MMHG | OXYGEN SATURATION: 94 %

## 2018-03-01 VITALS — HEART RATE: 80 BPM

## 2018-03-01 VITALS — OXYGEN SATURATION: 93 % | HEART RATE: 88 BPM

## 2018-03-01 VITALS — HEART RATE: 79 BPM

## 2018-03-01 VITALS — OXYGEN SATURATION: 96 %

## 2018-03-01 VITALS — OXYGEN SATURATION: 93 %

## 2018-03-01 VITALS — HEART RATE: 82 BPM

## 2018-03-01 LAB
ALBUMIN SERPL-MCNC: 2.3 GM/DL (ref 3.4–5)
ALP SERPL-CCNC: 86 U/L (ref 45–117)
ALT SERPL-CCNC: 169 U/L (ref 10–53)
AST SERPL-CCNC: 145 U/L (ref 15–37)
BASOPHILS # BLD AUTO: 0 TH/MM3 (ref 0–0.2)
BASOPHILS NFR BLD: 0.1 % (ref 0–2)
BILIRUB SERPL-MCNC: 0.4 MG/DL (ref 0.2–1)
BUN SERPL-MCNC: 40 MG/DL (ref 7–18)
CALCIUM SERPL-MCNC: 8 MG/DL (ref 8.5–10.1)
CHLORIDE SERPL-SCNC: 112 MEQ/L (ref 98–107)
CREAT SERPL-MCNC: 1.02 MG/DL (ref 0.5–1)
EOSINOPHIL # BLD: 0 TH/MM3 (ref 0–0.4)
EOSINOPHIL NFR BLD: 0 % (ref 0–4)
ERYTHROCYTE [DISTWIDTH] IN BLOOD BY AUTOMATED COUNT: 17.3 % (ref 11.6–17.2)
GFR SERPLBLD BASED ON 1.73 SQ M-ARVRAT: 54 ML/MIN (ref 89–?)
GLUCOSE SERPL-MCNC: 151 MG/DL (ref 74–106)
HCO3 BLD-SCNC: 19.9 MEQ/L (ref 21–32)
HCT VFR BLD CALC: 38.4 % (ref 35–46)
HGB BLD-MCNC: 13.3 GM/DL (ref 11.6–15.3)
LYMPHOCYTES # BLD AUTO: 0.9 TH/MM3 (ref 1–4.8)
LYMPHOCYTES NFR BLD AUTO: 9.2 % (ref 9–44)
MCH RBC QN AUTO: 32.1 PG (ref 27–34)
MCHC RBC AUTO-ENTMCNC: 34.6 % (ref 32–36)
MCV RBC AUTO: 92.7 FL (ref 80–100)
MONOCYTE #: 0.6 TH/MM3 (ref 0–0.9)
MONOCYTES NFR BLD: 5.8 % (ref 0–8)
NEUTROPHILS # BLD AUTO: 8.1 TH/MM3 (ref 1.8–7.7)
NEUTROPHILS NFR BLD AUTO: 84.9 % (ref 16–70)
PLATELET # BLD: 144 TH/MM3 (ref 150–450)
PMV BLD AUTO: 11 FL (ref 7–11)
PROT SERPL-MCNC: 6.7 GM/DL (ref 6.4–8.2)
RBC # BLD AUTO: 4.14 MIL/MM3 (ref 4–5.3)
SODIUM SERPL-SCNC: 142 MEQ/L (ref 136–145)
WBC # BLD AUTO: 9.6 TH/MM3 (ref 4–11)

## 2018-03-01 RX ADMIN — ATORVASTATIN CALCIUM SCH MG: 80 TABLET, FILM COATED ORAL at 21:28

## 2018-03-01 RX ADMIN — FAMOTIDINE SCH MG: 10 INJECTION, SOLUTION INTRAVENOUS at 14:10

## 2018-03-01 RX ADMIN — INSULIN ASPART SCH: 100 INJECTION, SOLUTION INTRAVENOUS; SUBCUTANEOUS at 18:21

## 2018-03-01 RX ADMIN — IPRATROPIUM BROMIDE AND ALBUTEROL SULFATE SCH AMPULE: .5; 3 SOLUTION RESPIRATORY (INHALATION) at 11:56

## 2018-03-01 RX ADMIN — CHLORHEXIDINE GLUCONATE 0.12% ORAL RINSE SCH ML: 1.2 LIQUID ORAL at 20:00

## 2018-03-01 RX ADMIN — IPRATROPIUM BROMIDE AND ALBUTEROL SULFATE SCH AMPULE: .5; 3 SOLUTION RESPIRATORY (INHALATION) at 07:37

## 2018-03-01 RX ADMIN — POTASSIUM BICARBONATE AND POTASSIUM CHLORIDE EFFERVESCENT TABLETS FOR ORAL SOLUTION SCH MEQ: 1.25; .7; 1.5 TABLET, EFFERVESCENT ORAL at 18:21

## 2018-03-01 RX ADMIN — Medication SCH ML: at 21:30

## 2018-03-01 RX ADMIN — TAZOBACTAM SODIUM AND PIPERACILLIN SODIUM SCH MLS/HR: 375; 3 INJECTION, SOLUTION INTRAVENOUS at 21:29

## 2018-03-01 RX ADMIN — OSELTAMIVIR PHOSPHATE SCH MG: 75 CAPSULE ORAL at 11:35

## 2018-03-01 RX ADMIN — LINEZOLID SCH MLS/HR: 600 INJECTION, SOLUTION INTRAVENOUS at 02:30

## 2018-03-01 RX ADMIN — PHENYTOIN SODIUM SCH MLS/HR: 50 INJECTION INTRAMUSCULAR; INTRAVENOUS at 04:57

## 2018-03-01 RX ADMIN — INSULIN ASPART SCH: 100 INJECTION, SOLUTION INTRAVENOUS; SUBCUTANEOUS at 04:32

## 2018-03-01 RX ADMIN — ABACAVIR SULFATE SCH MG: 300 TABLET, FILM COATED ORAL at 09:25

## 2018-03-01 RX ADMIN — IPRATROPIUM BROMIDE AND ALBUTEROL SULFATE SCH AMPULE: .5; 3 SOLUTION RESPIRATORY (INHALATION) at 15:39

## 2018-03-01 RX ADMIN — IPRATROPIUM BROMIDE AND ALBUTEROL SULFATE SCH AMPULE: .5; 3 SOLUTION RESPIRATORY (INHALATION) at 23:34

## 2018-03-01 RX ADMIN — CARVEDILOL SCH MG: 12.5 TABLET, FILM COATED ORAL at 21:30

## 2018-03-01 RX ADMIN — PHENYTOIN SODIUM SCH MLS/HR: 50 INJECTION INTRAMUSCULAR; INTRAVENOUS at 03:20

## 2018-03-01 RX ADMIN — INSULIN ASPART SCH: 100 INJECTION, SOLUTION INTRAVENOUS; SUBCUTANEOUS at 20:00

## 2018-03-01 RX ADMIN — CARVEDILOL SCH MG: 12.5 TABLET, FILM COATED ORAL at 09:26

## 2018-03-01 RX ADMIN — AMIODARONE HYDROCHLORIDE SCH MG: 200 TABLET ORAL at 09:26

## 2018-03-01 RX ADMIN — PHENYTOIN SODIUM SCH MLS/HR: 50 INJECTION INTRAMUSCULAR; INTRAVENOUS at 16:50

## 2018-03-01 RX ADMIN — TAZOBACTAM SODIUM AND PIPERACILLIN SODIUM SCH MLS/HR: 375; 3 INJECTION, SOLUTION INTRAVENOUS at 16:49

## 2018-03-01 RX ADMIN — TAZOBACTAM SODIUM AND PIPERACILLIN SODIUM SCH MLS/HR: 375; 3 INJECTION, SOLUTION INTRAVENOUS at 04:30

## 2018-03-01 RX ADMIN — LINEZOLID SCH MLS/HR: 600 INJECTION, SOLUTION INTRAVENOUS at 14:10

## 2018-03-01 RX ADMIN — ENOXAPARIN SODIUM SCH MG: 30 INJECTION SUBCUTANEOUS at 14:10

## 2018-03-01 RX ADMIN — OSELTAMIVIR PHOSPHATE SCH MG: 75 CAPSULE ORAL at 21:28

## 2018-03-01 RX ADMIN — CHLORHEXIDINE GLUCONATE SCH PACK: 500 CLOTH TOPICAL at 04:00

## 2018-03-01 RX ADMIN — INSULIN ASPART SCH: 100 INJECTION, SOLUTION INTRAVENOUS; SUBCUTANEOUS at 08:00

## 2018-03-01 RX ADMIN — CHLORHEXIDINE GLUCONATE SCH PACK: 500 CLOTH TOPICAL at 06:00

## 2018-03-01 RX ADMIN — ASPIRIN SCH MG: 81 TABLET ORAL at 09:25

## 2018-03-01 RX ADMIN — AMIODARONE HYDROCHLORIDE SCH MG: 200 TABLET ORAL at 21:28

## 2018-03-01 RX ADMIN — CLOPIDOGREL BISULFATE SCH MG: 75 TABLET, FILM COATED ORAL at 09:26

## 2018-03-01 RX ADMIN — INSULIN ASPART SCH: 100 INJECTION, SOLUTION INTRAVENOUS; SUBCUTANEOUS at 00:00

## 2018-03-01 RX ADMIN — CHLORHEXIDINE GLUCONATE 0.12% ORAL RINSE SCH ML: 1.2 LIQUID ORAL at 08:00

## 2018-03-01 RX ADMIN — IPRATROPIUM BROMIDE AND ALBUTEROL SULFATE SCH AMPULE: .5; 3 SOLUTION RESPIRATORY (INHALATION) at 03:15

## 2018-03-01 RX ADMIN — MONTELUKAST SODIUM SCH MG: 10 TABLET, COATED ORAL at 21:30

## 2018-03-01 RX ADMIN — INSULIN ASPART SCH: 100 INJECTION, SOLUTION INTRAVENOUS; SUBCUTANEOUS at 12:00

## 2018-03-01 RX ADMIN — FAMOTIDINE SCH MG: 10 INJECTION, SOLUTION INTRAVENOUS at 02:30

## 2018-03-01 RX ADMIN — Medication SCH ML: at 09:00

## 2018-03-01 RX ADMIN — POTASSIUM BICARBONATE AND POTASSIUM CHLORIDE EFFERVESCENT TABLETS FOR ORAL SOLUTION SCH MEQ: 1.25; .7; 1.5 TABLET, EFFERVESCENT ORAL at 16:49

## 2018-03-01 RX ADMIN — TAZOBACTAM SODIUM AND PIPERACILLIN SODIUM SCH MLS/HR: 375; 3 INJECTION, SOLUTION INTRAVENOUS at 11:35

## 2018-03-01 RX ADMIN — IPRATROPIUM BROMIDE AND ALBUTEROL SULFATE SCH AMPULE: .5; 3 SOLUTION RESPIRATORY (INHALATION) at 20:40

## 2018-03-01 NOTE — HHI.CCPN
Subjective


Remarks/Hospital Course


2/28: Patient is a 63-year-old  female with history of CVA in the past 

with right hemiparesis, expressive aphasia, coronary artery disease, chronic 

atrial fibrillation, carotid stenosis, HIV on HAART who was brought in from NH 

for severe respiratory distress and hypoxemia. Currently DNR. Recently 

diagnosed with influenza pneumonia currently on day 5 of Tamiflu.  Fever of 

100.7 in the ED. patient was in severe respiratory distress and hypoxemic 

immediately placed on BiPAP 70% oxygen.  Chest x-ray showed bibasilar 

infiltrate later confirmed with CT chest as multilobar infiltrate/pneumonia.  

Patient received IV Solu-Medrol and IV Zosyn after 2 blood culture obtained.  

Patient also also found to be in A. fib with RVR with rates in 140s. CBC showed 

WBC count 12.2. BUN/cr was 46/1.5 and was normal about a week ago.  Critical 

care medicine was requested to admit the patient.





Dr. Ronquillo evaluated the patient in the ED.  She still appears to be in moderate 

respiratory distress but BiPAP had been removed per patient request.  

Significant respiratory distress and expressive aphasia limits exam.  Patient 

is breathing approximately 40 breaths per minute, bilateral wheezing and coarse 

crackles heard.  I have added scheduled Solu-Medrol, DuoNeb.  Tamiflu will be 

continued, continue Zosyn and add Zyvox. ID consulted. Continue BiPAP at 12/5.  

Prognosis is guarded at this time








3/1: Resting comfortably in bed on nasal cannula, not in any acute distress.





Objective





Vital Signs








  Date Time  Temp Pulse Resp B/P (MAP) Pulse Ox O2 Delivery O2 Flow Rate FiO2


 


3/1/18 10:00  85      


 


3/1/18 08:00     93 Nasal Cannula 4.00 


 


3/1/18 08:00 97.7  25 110/68 (82)    


 


2/28/18 23:15        40














Intake and Output   


 


 3/1/18 3/1/18 3/2/18





 08:00 16:00 00:00


 


Intake Total 830 ml  


 


Output Total 400 ml  


 


Balance 430 ml  








Result Diagram:  


2/28/18 1028                                                                   

             2/28/18 1028





Other Results





Laboratory Tests








Test


  2/28/18


11:27


 


Blood Gas Puncture Site RT RADIAL 


 


Blood Gas Patient Temperature 98.6 


 


Blood Gas HCO3


  15 mmol/L


(22-26)


 


Blood Gas Base Excess


  -9.5 mmol/L


(-2-2)


 


Blood Gas Oxygen Saturation 93 % () 


 


Arterial Blood pH


  7.36


(7.380-7.420)


 


Arterial Blood Partial


Pressure CO2 27 mmHg


(38-42)


 


Arterial Blood Partial


Pressure O2 72 mmHG


()


 


Arterial Blood Oxygen Content


  20.0 Vol %


(12.0-20.0)


 


Arterial Blood


Carboxyhemoglobin 1.0 % (0-4) 


 


 


Arterial Blood Methemoglobin 0.6 % (0-2) 


 


Blood Gas Hemoglobin


  15.3 G/DL


(12.0-16.0)


 


Oxygen Delivery Device BiPAP 


 


Blood Gas Ventilator Setting IPAP15/[id number] 


 


Blood Gas Inspired Oxygen 70 % 








Imaging


CXR shows bilateral infiltrate


CT chest bilateral infiltrate involving multiple lobes


Objective Remarks


GENERAL: Obese 63-year-old woman laying in bed on nasal cannula not in any 

acute distress


SKIN: Skin is Warm and dry.


HEAD: Atraumatic. Normocephalic. 


EYES: Pupils equal and round. No scleral icterus. 


ENT: No nasal bleeding or discharge.  Mucous membranes dry


NECK: Trachea midline. No JVD. 


CARDIOVASCULAR: No murmur appreciated.  S1-S2 irregularly irregular


RESPIRATORY: Good air entry bilaterally, scattered rhonchi, no wheezing


GASTROINTESTINAL: Abdomen soft, non-tender, nondistended. Hepatic and splenic 

margins not palpable. 


MUSCULOSKELETAL: No obvious deformities. No edema. 


NEUROLOGICAL: Awake and alert.  Speech is incomprehensible, history of 

expressive aphasia.  Minimal right sided weakness which is old





A/P


Assessment and Plan


Neuro/Psych:


History of left MCA stroke with right hemiparesis


Depression


Peripheral neuropathy


Continue aspirin/Plavix


PT/OT once stabilized respiratory wise


Hold as needed Xanax, Prozac, Neurontin, Ambien





CV: 


Atrial fibrillation with rapid ventricular response


CAD with History of non-STEMI


Dyslipidemia


Left carotid stenosis


History of hypertension


Chronic A. fib


 Cardizem infusion for rate control


Continue Home medications Coreg and amiodarone, hold p.o. Cardizem, and losartan


Continue aspirin Plavix


Check 2 D Echo. Echocardiogram in 2015 EF around 50%.





Resp: 


Acute hypoxemic respiratory failure


Multilobar pneumonia recent influenza


Acute COPD exacerbation


BiPAP 10/5 with 70% FiO2


Fluid resuscitation with 1 L normal saline and 75 mL/h


IV Solu-Medrol 125 mg 1 and 60 mg every 8 hours


DuoNeb every 4 hours scheduled and as needed


Broad-spectrum antibiotics with Zosyn, Zyvox, Tamiflu





GI: 


History of hepatitis C


History diverticulosis


Elevated liver enzymes


Advance PO as tolerated.


IV Famotidine


Check liver and gallbladder ultrasound





:  


Acute kidney injury


Warner will be placed for accurate I's and O's in a critically ill patient


IVF as above





Endo:  


Diabetes mellitus


Place on sliding scale insulin.  Accu-Cheks to maintain euglycemia





Heme:


Monitor CBC daily.





ID:


Bilateral pneumonia healthcare associated


Reason influenza


HIV


Placed on Zosyn and Zyvox, and continue Tamiflu for influenza with superadded 

bacterial pneumonia


Continue HAART


Consult ID





ENDO:


Hypokalemia


Replace electrolytes as clinically indicated for electrolyte protocol





Access


Utilized peripheral IVs





Prophylaxis


GI - Protonix


DVT - SCD/Lovenox subcutaneous











Raulito Alcantar MD Mar 1, 2018 10:53

## 2018-03-01 NOTE — EKG
Date Performed: 02/28/2018       Time Performed: 10:37:17

 

PTAGE:      63 years

 

EKG:      ATRIAL FIBRILLATION WITH RAPID VENTRICULAR RESPONSE MARKED LEFT AXIS DEVIATION INTRAVENTRIC
ULAR CONDUCTION DELAY ABNORMAL ECG INTERPRETATION BASED ON A DEFAULT AGE OF 40 YEARS 

 

 PREVIOUS TRACING            : 02/28/2018 10.36 Compared to prior EKG, the patient is now in atrial f
ibrillation.

 

DOCTOR:   Edgardo Sainz  Interpretating Date/Time  03/01/2018 11:02:09

## 2018-03-01 NOTE — ECHRPT
Indication:   a fib/flutter

 

 CONCLUSIONS

 Normal left ventricular size. 

 The left ventricular systolic function is low normal with an estimated ejection fraction in the rang
e of 50-

 55%. 

 

 Trace-to-mild mitral valve regurgitation. 

  aortic valve sclerosis with mean gradient = 11 mm hg c/w mild aortic valve stenosis

 mitral annular calcification 

 

 There is mild tricuspid valve regurgitation. 

 The estimated pulmonary arterial pressure is 46.2 mmHg. 

 

 BP:        /         HR:                          Rhythm:

 

 MEASUREMENTS  (Male / Female) Normal Values       Technical Quality:Good

 2D ECHO

 LV Diastolic Diameter PLAX        3.4 cm                4.2 - 5.9 / 3.9 - 5.3 cm

 LV Systolic Diameter PLAX         2.6 cm                

 IVS Diastolic Thickness           1.6 cm                0.6 - 1.0 / 0.6 - 0.9 cm

 LVPW Diastolic Thickness          1.7 cm                0.6 - 1.0 / 0.6 - 0.9 cm

 LV Relative Wall Thickness        0.9                   

 RV Internal Dim ED PLAX           2.2 cm                

 LVOT Diameter                     2.0 cm                

 

 M-MODE

 Aortic Root Diameter MM           2.9 cm                

 LA Systolic Diameter MM           2.9 cm                

 LA Ao Ratio MM                    1.0                   

 AV Cusp Separation MM             1.3 cm                

 

 DOPPLER

 AV Peak Velocity                  233.0 cm/s            

 AV Peak Gradient                  21.7 mmHg             

 AV Mean Gradient                  11.0 mmHg             

 AV Velocity Time Integral         33.2 cm               

 AI Peak Velocity                  339.0 cm/s            

 AI Peak Gradient                  46.0 mmHg             

 AI Pressure Half Time             523.0 ms              

 LVOT Peak Velocity                97.8 cm/s             

 LVOT Peak Gradient                3.8 mmHg              

 LVOT Velocity Time Integral       13.6 cm               

 AV Area Cont Eq vti               1.3 cm               

 AV Area Cont Eq pk                1.3 cm               

 MV Area PHT                       1.9 cm               

 LV E' Lateral Velocity            8.8 cm/s              

 LV E' Septal Velocity             11.1 cm/s             

 TR Peak Velocity                  301.0 cm/s            

 TR Peak Gradient                  36.2 mmHg             

 Right Atrial Pressure             10.0 mmHg             

 Pulmonary Artery Systolic Pressu  46.2 mmHg             

 Right Ventricular Systolic Press  46.2 mmHg             

 

 

 FINDINGS

 

 LEFT VENTRICLE

 Normal left ventricular size. 

 The left ventricular systolic function is low normal with an estimated ejection fraction in the rang
e of 50-

 55%. 

 

 RIGHT VENTRICLE

 Normal right ventricular size and systolic function.  

 

 LEFT ATRIUM

 The left atrial size is normal.  

 

 RIGHT ATRIUM

 The right atrial size is normal.  

 

 ATRIAL SEPTUM

 Normal atrial septal thickness without atrial level shunting by limited color doppler interrogation.
  

 

 AORTA

 The aortic root and proximal ascending aorta are normal in size on limited imaging.  

 

 MITRAL VALVE

 Structurally normal mitral valve. 

 Trace-to-mild mitral valve regurgitation. 

 

 AORTIC VALVE

 Trileaflet aortic valve. 

 No aortic valve regurgitation. 

 No aortic valve stenosis. 

 

 TRICUSPID VALVE

 Structurally normal tricuspid valve. 

 There is mild tricuspid valve regurgitation. 

 The estimated pulmonary arterial pressure is 46.2 mmHg. 

 

 PULMONARY VALVE

 No pulmonary valve regurgitation or stenosis.  

 

 VESSELS

 The inferior vena cava is normal in size.  

 

 PERICARDIUM

 No pericardial effusion.  

 

 

 

 

  Anjel Hammer MD, FACC, [facility]

  (Electronically Signed)

  Final Date:01 March 2018 12:03

## 2018-03-01 NOTE — HHI.IDPN
Subjective


Subjective


Remarks


pt is now on NC O2 , off BIPAP


No fever


Gram positive rods in 1/4 bottles of blood clx (aerobic)


No sputum availbale (pt does not expectorate)


Antibiotics


Abacavir, Epivir, Sustiva, NOrvir





zyvox


zosyn


tamiflu


Allergies:  


Coded Allergies:  


     acetaminophen (Unverified  Allergy, Severe, GI UPSET, 8/15/17)


     hydrocodone (Unverified  Allergy, Severe, GI UPSET, 8/15/17)





Objective


.





Vital Signs








  Date Time  Temp Pulse Resp B/P (MAP) Pulse Ox O2 Delivery O2 Flow Rate FiO2


 


3/1/18 10:00  85      


 


3/1/18 08:00     93 Nasal Cannula 4.00 


 


3/1/18 08:00  85      


 


3/1/18 08:00 97.7 85 25 110/68 (82) 93   


 


3/1/18 07:37     93 Nasal Cannula 4.00 


 


3/1/18 06:00  78      


 


3/1/18 04:00 98.6 78 22 102/56 (71) 94   


 


3/1/18 04:00  78      


 


3/1/18 02:00     93  6.00 


 


3/1/18 02:00  88      


 


3/1/18 00:00 98.6 86 22 113/57 (75) 96   


 


3/1/18 00:00  86      


 


2/28/18 23:15     96   40


 


2/28/18 22:00  96      


 


2/28/18 21:16     93 Nasal Cannula 6.00 


 


2/28/18 20:00  102      


 


2/28/18 20:00     96 Bi-Pap  40


 


2/28/18 20:00 98.7 102 23 101/62 (75) 96   


 


2/28/18 19:12     97 BiPAP  50


 


2/28/18 19:11     97   50


 


2/28/18 18:00  104      


 


2/28/18 16:00  109      


 


2/28/18 16:00 99.6 109 34 107/61 (76) 92   








.





Laboratory Tests








Test


  2/28/18


10:28 3/1/18


10:31


 


White Blood Count 12.2 TH/MM3  9.6 TH/MM3 


 


Red Blood Count 4.76 MIL/MM3  4.14 MIL/MM3 


 


Hemoglobin 15.3 GM/DL  13.3 GM/DL 


 


Hematocrit 44.3 %  38.4 % 


 


Mean Corpuscular Volume 93.1 FL  92.7 FL 


 


Mean Corpuscular Hemoglobin 32.1 PG  32.1 PG 


 


Mean Corpuscular Hemoglobin


Concent 34.4 % 


  34.6 % 


 


 


Red Cell Distribution Width 17.2 %  17.3 % 


 


Platelet Count 170 TH/MM3  144 TH/MM3 


 


Mean Platelet Volume 11.5 FL  11.0 FL 


 


Neutrophils (%) (Auto) 87.6 %  84.9 % 


 


Lymphocytes (%) (Auto) 6.7 %  9.2 % 


 


Monocytes (%) (Auto) 5.2 %  5.8 % 


 


Eosinophils (%) (Auto) 0.2 %  0.0 % 


 


Basophils (%) (Auto) 0.3 %  0.1 % 


 


Neutrophils # (Auto) 10.7 TH/MM3  8.1 TH/MM3 


 


Lymphocytes # (Auto) 0.8 TH/MM3  0.9 TH/MM3 


 


Monocytes # (Auto) 0.6 TH/MM3  0.6 TH/MM3 


 


Eosinophils # (Auto) 0.0 TH/MM3  0.0 TH/MM3 


 


Basophils # (Auto) 0.0 TH/MM3  0.0 TH/MM3 


 


CBC Comment AUTO DIFF  DIFF FINAL 


 


Differential Total Cells


Counted 100 


  


 


 


Neutrophils % (Manual) 70 %  


 


Band Neutrophils % 18 %  


 


Lymphocytes % 8 %  


 


Monocytes % 4 %  


 


Neutrophils # (Manual) 10.7 TH/MM3  


 


Differential Comment


  FINAL DIFF


MANUAL  


 


 


Toxic Vacuolation PRESENT  


 


Platelet Estimate NORMAL  


 


Platelet Morphology Comment ENLARGED  








Laboratory Tests








Test


  2/28/18


10:28 2/28/18


19:41 3/1/18


10:31


 


Blood Urea Nitrogen 46 MG/DL   40 MG/DL 


 


Creatinine 1.48 MG/DL   1.02 MG/DL 


 


Random Glucose 162 MG/DL   151 MG/DL 


 


Total Protein 7.6 GM/DL   6.7 GM/DL 


 


Albumin 2.8 GM/DL   2.3 GM/DL 


 


Calcium Level 8.2 MG/DL   8.0 MG/DL 


 


Alkaline Phosphatase 109 U/L   86 U/L 


 


Aspartate Amino Transf


(AST/SGOT) 129 U/L 


  


  145 U/L 


 


 


Alanine Aminotransferase


(ALT/SGPT) 177 U/L 


  


  169 U/L 


 


 


Total Bilirubin 0.6 MG/DL   0.4 MG/DL 


 


Sodium Level 137 MEQ/L   142 MEQ/L 


 


Potassium Level 3.5 MEQ/L   3.1 MEQ/L 


 


Chloride Level 109 MEQ/L   112 MEQ/L 


 


Carbon Dioxide Level 16.8 MEQ/L   19.9 MEQ/L 


 


Anion Gap 11 MEQ/L   10 MEQ/L 


 


Estimat Glomerular Filtration


Rate 35 ML/MIN 


  


  54 ML/MIN 


 


 


Lactic Acid Level 1.6 mmol/L   


 


Troponin I 0.02 NG/ML  0.03 NG/ML  


 


B-Type Natriuretic Peptide 111 PG/ML   


 


Magnesium Level  1.2 MG/DL  








Microbiology








 Date/Time


Source Procedure


Growth Status


 


 


 2/28/18 10:35


Blood Peripheral Aerobic Blood Culture - Preliminary


NO GROWTH IN 1 DAY Resulted


 


 2/28/18 10:35


Blood Peripheral Anaerobic Blood Culture - Preliminary


NO GROWTH IN 1 DAY Resulted





 2/28/18 10:28


Blood Peripheral Aerobic Blood Culture - Preliminary


Gram Positive Rods Resulted


 


 2/28/18 10:28


Blood Peripheral Anaerobic Blood Culture - Preliminary


NO GROWTH IN 1 DAY Resulted








Imaging





Last Impressions








Chest X-Ray 3/1/18 0600 Signed





Impressions: 





 Service Date/Time:  Thursday, March 1, 2018 03:38 - CONCLUSION:  1. Slightly 





 more prominent patchy airspace disease in the left midlung zone. 2. Stable 





 patchy airspace disease in the right mid to lower lung zones and left lower 

lung 





 zone.     Homero Dawkins MD 


 


Liver Ultrasound 2/28/18 0000 Signed





Impressions: 





 Service Date/Time:  Wednesday, February 28, 2018 13:48 - CONCLUSION:  1. Mild 





 increased echotexture of the liver may indicate steatosis. 2. Cholelithiasis. 

   





  Yg Rodarte MD 


 


Chest CT 2/28/18 0000 Signed





Impressions: 





 Service Date/Time:  Wednesday, February 28, 2018 13:27 - CONCLUSION:  Patchy 





 airspace disease scattered consolidation both lungs.  No pleural effusion.    

  





 Lito Salomon MD  FACR








Physical Exam


CONSTITUTIONAL/GENERAL: This is a morbidly obese patient, in no apparent 

distress.


On BIPAP 50%, partial face mask


TUBES/LINES/DRAINS:


SKIN: No jaundice, rashes, or lesions.   Skin temperature appropriate. Not 

diaphoretic. 


BREASTS: mastectomy scar L breast - well healed


HEAD: Atraumatic. Normocephalic.


EYES: Pupils equal and round and reactive. Extraocular motions intact. No 

scleral icterus. No injection or drainage. Fundi not examined.


ENT: Hearing grossly normal. Nose without bleeding or purulent drainage. 

Limited mucosae exam without visible erythema, or lesions.





NECK: Trachea midline. Supple, nontender.  


CARDIOVASCULAR: Regular rate and rhythm without murmurs, gallops, or rubs. No 

JVD. Peripheral pulses symmetric. Perifery well perfused 


RESPIRATORY/CHEST: Symmetric, unlabored respirations. Diffuse b/l to 

auscultation. Breath sounds equal bilaterally.   


GASTROINTESTINAL: Abdomen soft, non-tender, nondistended. No hepato-splenomegaly

, or palpable masses. No guarding. Bowel sounds present.


GENITOURINARY: Without palpable bladder distension. 


MUSCULOSKELETAL: Extremities without clubbing, cyanosis, or edema.  


NEUROLOGICAL: Awake and alert. Motor and sensory grossly within normal limits. 

Follows commands. Clear speech Moves all extremities.


PSYCHIATRIC: No obvious anxiety/depression. no apparent hallucinations or other 

psychotic thought process.








Assessment & Plan


Remarks


Assessment and Plan


Influenza


 PNA


Respiratory insufficiency: resolved


   - doing much better


HIV, unknown immunosuppression status


Morbid obesety


COPD


Chronic tobaccoism





   cont current abx


   cont tamiflu


   cont  HAART [facility] provider (Dr Rose)


   fu sputum clx


   fu blood clx











Maria Teresa Hammer MD Mar 1, 2018 15:39

## 2018-03-02 VITALS
DIASTOLIC BLOOD PRESSURE: 80 MMHG | OXYGEN SATURATION: 93 % | HEART RATE: 84 BPM | SYSTOLIC BLOOD PRESSURE: 163 MMHG | RESPIRATION RATE: 16 BRPM | TEMPERATURE: 97.8 F

## 2018-03-02 VITALS
DIASTOLIC BLOOD PRESSURE: 95 MMHG | RESPIRATION RATE: 29 BRPM | HEART RATE: 91 BPM | TEMPERATURE: 97.6 F | SYSTOLIC BLOOD PRESSURE: 174 MMHG | OXYGEN SATURATION: 95 %

## 2018-03-02 VITALS
RESPIRATION RATE: 26 BRPM | TEMPERATURE: 97.6 F | DIASTOLIC BLOOD PRESSURE: 91 MMHG | OXYGEN SATURATION: 94 % | HEART RATE: 88 BPM | SYSTOLIC BLOOD PRESSURE: 167 MMHG

## 2018-03-02 VITALS
DIASTOLIC BLOOD PRESSURE: 89 MMHG | OXYGEN SATURATION: 93 % | RESPIRATION RATE: 25 BRPM | SYSTOLIC BLOOD PRESSURE: 150 MMHG | TEMPERATURE: 97.8 F | HEART RATE: 92 BPM

## 2018-03-02 VITALS
DIASTOLIC BLOOD PRESSURE: 103 MMHG | SYSTOLIC BLOOD PRESSURE: 181 MMHG | TEMPERATURE: 97.5 F | HEART RATE: 99 BPM | RESPIRATION RATE: 36 BRPM | OXYGEN SATURATION: 94 %

## 2018-03-02 VITALS
RESPIRATION RATE: 32 BRPM | HEART RATE: 96 BPM | SYSTOLIC BLOOD PRESSURE: 190 MMHG | DIASTOLIC BLOOD PRESSURE: 89 MMHG | OXYGEN SATURATION: 93 %

## 2018-03-02 VITALS
OXYGEN SATURATION: 94 % | RESPIRATION RATE: 18 BRPM | HEART RATE: 79 BPM | SYSTOLIC BLOOD PRESSURE: 136 MMHG | DIASTOLIC BLOOD PRESSURE: 66 MMHG | TEMPERATURE: 97.9 F

## 2018-03-02 VITALS — HEART RATE: 94 BPM

## 2018-03-02 VITALS — HEART RATE: 81 BPM

## 2018-03-02 VITALS — HEART RATE: 93 BPM

## 2018-03-02 VITALS — OXYGEN SATURATION: 92 %

## 2018-03-02 VITALS — OXYGEN SATURATION: 93 %

## 2018-03-02 VITALS — HEART RATE: 92 BPM

## 2018-03-02 LAB
ALBUMIN SERPL-MCNC: 2.1 GM/DL (ref 3.4–5)
ALP SERPL-CCNC: 98 U/L (ref 45–117)
ALT SERPL-CCNC: 117 U/L (ref 10–53)
AST SERPL-CCNC: 67 U/L (ref 15–37)
BASOPHILS # BLD AUTO: 0 TH/MM3 (ref 0–0.2)
BASOPHILS NFR BLD: 0.1 % (ref 0–2)
BILIRUB SERPL-MCNC: 0.3 MG/DL (ref 0.2–1)
BUN SERPL-MCNC: 40 MG/DL (ref 7–18)
CALCIUM SERPL-MCNC: 8 MG/DL (ref 8.5–10.1)
CHLORIDE SERPL-SCNC: 115 MEQ/L (ref 98–107)
CREAT SERPL-MCNC: 0.85 MG/DL (ref 0.5–1)
EOSINOPHIL # BLD: 0 TH/MM3 (ref 0–0.4)
EOSINOPHIL NFR BLD: 0 % (ref 0–4)
ERYTHROCYTE [DISTWIDTH] IN BLOOD BY AUTOMATED COUNT: 17.7 % (ref 11.6–17.2)
GFR SERPLBLD BASED ON 1.73 SQ M-ARVRAT: 67 ML/MIN (ref 89–?)
GLUCOSE SERPL-MCNC: 160 MG/DL (ref 74–106)
HCO3 BLD-SCNC: 18.2 MEQ/L (ref 21–32)
HCT VFR BLD CALC: 36.9 % (ref 35–46)
HGB BLD-MCNC: 12.9 GM/DL (ref 11.6–15.3)
LYMPHOCYTES # BLD AUTO: 0.7 TH/MM3 (ref 1–4.8)
LYMPHOCYTES NFR BLD AUTO: 7.8 % (ref 9–44)
MCH RBC QN AUTO: 32.1 PG (ref 27–34)
MCHC RBC AUTO-ENTMCNC: 34.9 % (ref 32–36)
MCV RBC AUTO: 92.1 FL (ref 80–100)
MONOCYTE #: 0.5 TH/MM3 (ref 0–0.9)
MONOCYTES NFR BLD: 5.2 % (ref 0–8)
NEUTROPHILS # BLD AUTO: 7.9 TH/MM3 (ref 1.8–7.7)
NEUTROPHILS NFR BLD AUTO: 86.9 % (ref 16–70)
PLATELET # BLD: 137 TH/MM3 (ref 150–450)
PMV BLD AUTO: 10.9 FL (ref 7–11)
PROT SERPL-MCNC: 6.2 GM/DL (ref 6.4–8.2)
RBC # BLD AUTO: 4.01 MIL/MM3 (ref 4–5.3)
SODIUM SERPL-SCNC: 143 MEQ/L (ref 136–145)
WBC # BLD AUTO: 9.1 TH/MM3 (ref 4–11)

## 2018-03-02 RX ADMIN — TAZOBACTAM SODIUM AND PIPERACILLIN SODIUM SCH MLS/HR: 375; 3 INJECTION, SOLUTION INTRAVENOUS at 18:06

## 2018-03-02 RX ADMIN — IPRATROPIUM BROMIDE AND ALBUTEROL SULFATE SCH AMPULE: .5; 3 SOLUTION RESPIRATORY (INHALATION) at 02:54

## 2018-03-02 RX ADMIN — LINEZOLID SCH MLS/HR: 600 INJECTION, SOLUTION INTRAVENOUS at 01:49

## 2018-03-02 RX ADMIN — POTASSIUM CHLORIDE SCH MLS/HR: 400 INJECTION, SOLUTION INTRAVENOUS at 18:53

## 2018-03-02 RX ADMIN — CARVEDILOL SCH MG: 12.5 TABLET, FILM COATED ORAL at 09:04

## 2018-03-02 RX ADMIN — OSELTAMIVIR PHOSPHATE SCH MG: 75 CAPSULE ORAL at 20:25

## 2018-03-02 RX ADMIN — ABACAVIR SULFATE SCH MG: 300 TABLET, FILM COATED ORAL at 09:04

## 2018-03-02 RX ADMIN — INSULIN ASPART SCH: 100 INJECTION, SOLUTION INTRAVENOUS; SUBCUTANEOUS at 18:07

## 2018-03-02 RX ADMIN — POTASSIUM CHLORIDE SCH MLS/HR: 400 INJECTION, SOLUTION INTRAVENOUS at 22:57

## 2018-03-02 RX ADMIN — FAMOTIDINE SCH MG: 10 INJECTION, SOLUTION INTRAVENOUS at 01:49

## 2018-03-02 RX ADMIN — PHENYTOIN SODIUM SCH MLS/HR: 50 INJECTION INTRAMUSCULAR; INTRAVENOUS at 20:25

## 2018-03-02 RX ADMIN — FAMOTIDINE SCH MG: 10 INJECTION, SOLUTION INTRAVENOUS at 14:51

## 2018-03-02 RX ADMIN — PHENYTOIN SODIUM SCH MLS/HR: 50 INJECTION INTRAMUSCULAR; INTRAVENOUS at 06:53

## 2018-03-02 RX ADMIN — IPRATROPIUM BROMIDE AND ALBUTEROL SULFATE SCH AMPULE: .5; 3 SOLUTION RESPIRATORY (INHALATION) at 07:54

## 2018-03-02 RX ADMIN — ATORVASTATIN CALCIUM SCH MG: 80 TABLET, FILM COATED ORAL at 20:26

## 2018-03-02 RX ADMIN — CHLORHEXIDINE GLUCONATE 0.12% ORAL RINSE SCH ML: 1.2 LIQUID ORAL at 20:00

## 2018-03-02 RX ADMIN — MONTELUKAST SODIUM SCH MG: 10 TABLET, COATED ORAL at 20:26

## 2018-03-02 RX ADMIN — Medication SCH ML: at 20:27

## 2018-03-02 RX ADMIN — OSELTAMIVIR PHOSPHATE SCH MG: 75 CAPSULE ORAL at 09:03

## 2018-03-02 RX ADMIN — ASPIRIN SCH MG: 81 TABLET ORAL at 09:04

## 2018-03-02 RX ADMIN — AMIODARONE HYDROCHLORIDE SCH MG: 200 TABLET ORAL at 09:04

## 2018-03-02 RX ADMIN — POTASSIUM CHLORIDE SCH MLS/HR: 400 INJECTION, SOLUTION INTRAVENOUS at 14:52

## 2018-03-02 RX ADMIN — CHLORHEXIDINE GLUCONATE SCH PACK: 500 CLOTH TOPICAL at 04:00

## 2018-03-02 RX ADMIN — Medication SCH ML: at 09:00

## 2018-03-02 RX ADMIN — ENOXAPARIN SODIUM SCH MG: 40 INJECTION SUBCUTANEOUS at 14:51

## 2018-03-02 RX ADMIN — IPRATROPIUM BROMIDE AND ALBUTEROL SULFATE SCH AMPULE: .5; 3 SOLUTION RESPIRATORY (INHALATION) at 19:37

## 2018-03-02 RX ADMIN — IPRATROPIUM BROMIDE AND ALBUTEROL SULFATE SCH AMPULE: .5; 3 SOLUTION RESPIRATORY (INHALATION) at 16:00

## 2018-03-02 RX ADMIN — INSULIN ASPART SCH: 100 INJECTION, SOLUTION INTRAVENOUS; SUBCUTANEOUS at 20:00

## 2018-03-02 RX ADMIN — CLOPIDOGREL BISULFATE SCH MG: 75 TABLET, FILM COATED ORAL at 09:04

## 2018-03-02 RX ADMIN — AMIODARONE HYDROCHLORIDE SCH MG: 200 TABLET ORAL at 20:26

## 2018-03-02 RX ADMIN — INSULIN ASPART SCH: 100 INJECTION, SOLUTION INTRAVENOUS; SUBCUTANEOUS at 12:47

## 2018-03-02 RX ADMIN — TAZOBACTAM SODIUM AND PIPERACILLIN SODIUM SCH MLS/HR: 375; 3 INJECTION, SOLUTION INTRAVENOUS at 06:54

## 2018-03-02 RX ADMIN — CHLORHEXIDINE GLUCONATE 0.12% ORAL RINSE SCH ML: 1.2 LIQUID ORAL at 08:00

## 2018-03-02 RX ADMIN — CARVEDILOL SCH MG: 12.5 TABLET, FILM COATED ORAL at 20:25

## 2018-03-02 RX ADMIN — TAZOBACTAM SODIUM AND PIPERACILLIN SODIUM SCH MLS/HR: 375; 3 INJECTION, SOLUTION INTRAVENOUS at 12:47

## 2018-03-02 RX ADMIN — INSULIN ASPART SCH: 100 INJECTION, SOLUTION INTRAVENOUS; SUBCUTANEOUS at 08:00

## 2018-03-02 RX ADMIN — INSULIN ASPART SCH: 100 INJECTION, SOLUTION INTRAVENOUS; SUBCUTANEOUS at 04:00

## 2018-03-02 RX ADMIN — LINEZOLID SCH MLS/HR: 600 INJECTION, SOLUTION INTRAVENOUS at 14:51

## 2018-03-02 RX ADMIN — INSULIN ASPART SCH: 100 INJECTION, SOLUTION INTRAVENOUS; SUBCUTANEOUS at 00:00

## 2018-03-02 RX ADMIN — IPRATROPIUM BROMIDE AND ALBUTEROL SULFATE SCH AMPULE: .5; 3 SOLUTION RESPIRATORY (INHALATION) at 11:18

## 2018-03-02 NOTE — HHI.IDPN
Subjective


Subjective


Remarks


Delayed entry - pt was seen earlier today around 4 pm 


Dw RN


pt is now on NC O2 , off BIPAP


No fever


Diarrhea today , 5 BMs





Gram positive rods in 1/4 bottles of blood clx (aerobic), another culture with 

G positive organism


No sputum availbale (pt does not expectorate)


Antibiotics


Abacavir, Epivir, Sustiva, NOrvir





zyvox


zosyn


tamiflu


Allergies:  


Coded Allergies:  


     acetaminophen (Unverified  Allergy, Severe, GI UPSET, 8/15/17)


     hydrocodone (Unverified  Allergy, Severe, GI UPSET, 8/15/17)





Objective


.





Vital Signs








  Date Time  Temp Pulse Resp B/P (MAP) Pulse Ox O2 Delivery O2 Flow Rate FiO2


 


3/2/18 19:38     93 Nasal Cannula 6.00 


 


3/2/18 18:00  94      


 


3/2/18 16:00 97.6 91 29 174/95 (121) 95   


 


3/2/18 16:00  91      


 


3/2/18 14:00  92      


 


3/2/18 12:00 97.8 92 25 150/89 (109) 93   


 


3/2/18 12:00  92      


 


3/2/18 10:00  94      


 


3/2/18 08:00 97.6 88 26 167/91 (116) 94   


 


3/2/18 07:56     92 Nasal Cannula 6.00 


 


3/2/18 07:00      Nasal Cannula 4.00 


 


3/2/18 06:00  93      


 


3/2/18 04:00 97.8 84 16 163/80 (107) 93   


 


3/2/18 04:00  84      


 


3/2/18 02:00  81      


 


3/2/18 00:00  79      


 


3/2/18 00:00 97.9 79 18 136/66 (89) 94   


 


3/1/18 22:00  79      


 


3/1/18 20:42     96 Nasal Cannula 4.00 














 3/2/18 3/2/18 3/3/18





 15:00 23:00 07:00


 


Intake Total  970 ml 


 


Output Total  100 ml 


 


Balance  870 ml 


 


   


 


Intake Oral  520 ml 


 


IV Total  450 ml 


 


Output Urine Total  100 ml 


 


# Voids  3 


 


# Bowel Movements  2 








.





Laboratory Tests








Test


  3/1/18


10:31 3/2/18


04:00


 


White Blood Count 9.6 TH/MM3  9.1 TH/MM3 


 


Red Blood Count 4.14 MIL/MM3  4.01 MIL/MM3 


 


Hemoglobin 13.3 GM/DL  12.9 GM/DL 


 


Hematocrit 38.4 %  36.9 % 


 


Mean Corpuscular Volume 92.7 FL  92.1 FL 


 


Mean Corpuscular Hemoglobin 32.1 PG  32.1 PG 


 


Mean Corpuscular Hemoglobin


Concent 34.6 % 


  34.9 % 


 


 


Red Cell Distribution Width 17.3 %  17.7 % 


 


Platelet Count 144 TH/MM3  137 TH/MM3 


 


Mean Platelet Volume 11.0 FL  10.9 FL 


 


Neutrophils (%) (Auto) 84.9 %  86.9 % 


 


Lymphocytes (%) (Auto) 9.2 %  7.8 % 


 


Monocytes (%) (Auto) 5.8 %  5.2 % 


 


Eosinophils (%) (Auto) 0.0 %  0.0 % 


 


Basophils (%) (Auto) 0.1 %  0.1 % 


 


Neutrophils # (Auto) 8.1 TH/MM3  7.9 TH/MM3 


 


Lymphocytes # (Auto) 0.9 TH/MM3  0.7 TH/MM3 


 


Monocytes # (Auto) 0.6 TH/MM3  0.5 TH/MM3 


 


Eosinophils # (Auto) 0.0 TH/MM3  0.0 TH/MM3 


 


Basophils # (Auto) 0.0 TH/MM3  0.0 TH/MM3 


 


CBC Comment DIFF FINAL  AUTO DIFF 


 


Differential Comment


   


  AUTO DIFF


CONFIRMED








Laboratory Tests








Test


  3/1/18


10:31 3/2/18


04:00


 


Blood Urea Nitrogen 40 MG/DL  40 MG/DL 


 


Creatinine 1.02 MG/DL  0.85 MG/DL 


 


Random Glucose 151 MG/DL  160 MG/DL 


 


Total Protein 6.7 GM/DL  6.2 GM/DL 


 


Albumin 2.3 GM/DL  2.1 GM/DL 


 


Calcium Level 8.0 MG/DL  8.0 MG/DL 


 


Alkaline Phosphatase 86 U/L  98 U/L 


 


Aspartate Amino Transf


(AST/SGOT) 145 U/L 


  67 U/L 


 


 


Alanine Aminotransferase


(ALT/SGPT) 169 U/L 


  117 U/L 


 


 


Total Bilirubin 0.4 MG/DL  0.3 MG/DL 


 


Sodium Level 142 MEQ/L  143 MEQ/L 


 


Potassium Level 3.1 MEQ/L  2.7 MEQ/L 


 


Chloride Level 112 MEQ/L  115 MEQ/L 


 


Carbon Dioxide Level 19.9 MEQ/L  18.2 MEQ/L 


 


Anion Gap 10 MEQ/L  10 MEQ/L 


 


Estimat Glomerular Filtration


Rate 54 ML/MIN 


  67 ML/MIN 


 








Microbiology








 Date/Time


Source Procedure


Growth Status


 


 


 2/28/18 10:35


Blood Peripheral Aerobic Blood Culture - Preliminary


Gram Positive Cocci Resulted


 


 2/28/18 10:35


Blood Peripheral Anaerobic Blood Culture - Preliminary


NO GROWTH IN 2 DAYS Resulted





 2/28/18 10:28


Blood Peripheral Aerobic Blood Culture - Final


Bacillus Species Not Anthracis Resulted


 


 2/28/18 10:28


Blood Peripheral Anaerobic Blood Culture - Preliminary


NO GROWTH IN 2 DAYS Resulted








Imaging


 


Last Impressions








Chest X-Ray 3/1/18 0600 Signed





Impressions: 





 Service Date/Time:  Thursday, March 1, 2018 03:38 - CONCLUSION:  1. Slightly 





 more prominent patchy airspace disease in the left midlung zone. 2. Stable 





 patchy airspace disease in the right mid to lower lung zones and left lower 

lung 





 zone.     Homero Dawkins MD 


 


Liver Ultrasound 2/28/18 0000 Signed





Impressions: 





 Service Date/Time:  Wednesday, February 28, 2018 13:48 - CONCLUSION:  1. Mild 





 increased echotexture of the liver may indicate steatosis. 2. Cholelithiasis. 

   





  Yg Rodarte MD 


 


Chest CT 2/28/18 0000 Signed





Impressions: 





 Service Date/Time:  Wednesday, February 28, 2018 13:27 - CONCLUSION:  Patchy 





 airspace disease scattered consolidation both lungs.  No pleural effusion.    

  





 Lito Salomon MD  FACR








Physical Exam


CONSTITUTIONAL/GENERAL: This is a morbidly obese patient, in no apparent 

distress.


 


TUBES/LINES/DRAINS:


SKIN: No jaundice, rashes, or lesions.   Skin temperature appropriate. Not 

diaphoretic. 


BREASTS: mastectomy scar L breast - well healed


HEAD: Atraumatic. Normocephalic.


EYES: Pupils equal and round and reactive. Extraocular motions intact. No 

scleral icterus. No injection or drainage. Fundi not examined.


ENT: Hearing grossly normal. Nose without bleeding or purulent drainage. 

Limited mucosae exam without visible erythema, or lesions.





NECK: Trachea midline. Supple, nontender.  


CARDIOVASCULAR: Regular rate and rhythm without murmurs, gallops, or rubs. No 

JVD. Peripheral pulses symmetric. Perifery well perfused 


RESPIRATORY/CHEST: Symmetric, unlabored respirations. Diffuse b/l  rhonchi to 

auscultation. Breath sounds equal bilaterally.   


GASTROINTESTINAL: Abdomen soft, non-tender, distended. No hepato-splenomegaly, 

or palpable masses. No guarding. Bowel sounds present.


GENITOURINARY: Without palpable bladder distension. 


MUSCULOSKELETAL: Extremities without clubbing, cyanosis, or edema.  


NEUROLOGICAL: Awake and alert. Motor and sensory grossly within normal limits. 

Follows commands. Clear speech Moves all extremities.


PSYCHIATRIC: cooperativ





Assessment & Plan


Remarks


Assessment and Plan


Influenza


 PNA


Respiratory insufficiency: resolved


   - doing much better


HIV, unknown immunosuppression status


Morbid obesety


COPD


Chronic tobaccoism


Abx associated  diarrhea - new issue 





   cont current abx


   cont tamiflu


   cont  HAART [facility] provider (Dr Rose)


   fu sputum clx


   fu blood clx


   chk stool for C.diff











Maria Teresa Hammer MD Mar 2, 2018 20:19

## 2018-03-02 NOTE — HHI.CCPN
Subjective


Remarks/Hospital Course


2/28: Patient is a 63-year-old  female with history of CVA in the past 

with right hemiparesis, expressive aphasia, coronary artery disease, chronic 

atrial fibrillation, carotid stenosis, HIV on HAART who was brought in from NH 

for severe respiratory distress and hypoxemia. Currently DNR. Recently 

diagnosed with influenza pneumonia currently on day 5 of Tamiflu.  Fever of 

100.7 in the ED. patient was in severe respiratory distress and hypoxemic 

immediately placed on BiPAP 70% oxygen.  Chest x-ray showed bibasilar 

infiltrate later confirmed with CT chest as multilobar infiltrate/pneumonia.  

Patient received IV Solu-Medrol and IV Zosyn after 2 blood culture obtained.  

Patient also also found to be in A. fib with RVR with rates in 140s. CBC showed 

WBC count 12.2. BUN/cr was 46/1.5 and was normal about a week ago.  Critical 

care medicine was requested to admit the patient.





Dr. Ronquillo evaluated the patient in the ED.  She still appears to be in moderate 

respiratory distress but BiPAP had been removed per patient request.  

Significant respiratory distress and expressive aphasia limits exam.  Patient 

is breathing approximately 40 breaths per minute, bilateral wheezing and coarse 

crackles heard.  I have added scheduled Solu-Medrol, DuoNeb.  Tamiflu will be 

continued, continue Zosyn and add Zyvox. ID consulted. Continue BiPAP at 12/5.  

Prognosis is guarded at this time








3/1: Resting comfortably in bed on nasal cannula, not in any acute distress.





3/2: Resting comfortably on nasal cannula not in any acute distress.





Objective





Vital Signs








  Date Time  Temp Pulse Resp B/P (MAP) Pulse Ox O2 Delivery O2 Flow Rate FiO2


 


3/2/18 12:00 97.8 92 25 150/89 (109) 93   


 


3/2/18 07:56      Nasal Cannula 6.00 


 


2/28/18 23:15        40














Intake and Output   


 


 3/2/18 3/2/18 3/3/18





 08:00 16:00 00:00


 


Intake Total 120 ml  


 


Output Total 500 ml  


 


Balance -380 ml  








Result Diagram:  


3/2/18 0400                                                                    

            3/2/18 0400





Imaging


CXR shows bilateral infiltrate


CT chest bilateral infiltrate involving multiple lobes


Objective Remarks


GENERAL: Obese 63-year-old woman laying in bed on nasal cannula not in any 

acute distress


SKIN: Skin is Warm and dry.


HEAD: Atraumatic. Normocephalic. 


EYES: Pupils equal and round. No scleral icterus. 


ENT: No nasal bleeding or discharge.  Mucous membranes dry


NECK: Trachea midline. No JVD. 


CARDIOVASCULAR: No murmur appreciated.  S1-S2 irregularly irregular


RESPIRATORY: Good air entry bilaterally, scattered rhonchi, no wheezing


GASTROINTESTINAL: Abdomen soft, non-tender, nondistended. Hepatic and splenic 

margins not palpable. 


MUSCULOSKELETAL: No obvious deformities. No edema. 


NEUROLOGICAL: Awake and alert.  Speech is incomprehensible, history of 

expressive aphasia.  Minimal right sided weakness which is old





A/P


Assessment and Plan


Neuro/Psych:


History of left MCA stroke with right hemiparesis


Depression


Peripheral neuropathy


Continue aspirin/Plavix


PT/OT once stabilized respiratory wise


Hold as needed Xanax, Prozac, Neurontin, Ambien





CV: 


Atrial fibrillation with rapid ventricular response


CAD with History of non-STEMI


Dyslipidemia


Left carotid stenosis


History of hypertension


Chronic A. fib


 Cardizem infusion for rate control


Continue Home medications Coreg and amiodarone, hold p.o. Cardizem, and losartan


Continue aspirin Plavix


Check 2 D Echo. Echocardiogram in 2015 EF around 50%.





Resp: 


Acute hypoxemic respiratory failure


Multilobar pneumonia recent influenza


Acute COPD exacerbation


Off BiPAP


On maintenance IV fluids


IV Solu-Medrol 125 mg 1 and 60 mg every 8 hours


DuoNeb every 4 hours scheduled and as needed


Broad-spectrum antibiotics with Zosyn, Zyvox, Tamiflu





GI: 


History of hepatitis C


History diverticulosis


Elevated liver enzymes


Advance PO as tolerated.


IV Famotidine


Check liver and gallbladder ultrasound





:  


Acute kidney injury


Warner will be placed for accurate I's and O's in a critically ill patient


IVF as above





Endo:  


Diabetes mellitus


Place on sliding scale insulin.  Accu-Cheks to maintain euglycemia





Heme:


Monitor CBC daily.





ID:


Bilateral pneumonia healthcare associated


Reason influenza


HIV


Placed on Zosyn and Zyvox, and continue Tamiflu for influenza with superadded 

bacterial pneumonia


Continue HAART


Consult ID





ENDO:


Hypokalemia


Replace electrolytes as clinically indicated for electrolyte protocol





Access


Utilized peripheral IVs





Prophylaxis


GI - Protonix


DVT - SCD/Lovenox subcutaneous





Consult and transfer to hospitalist service for further medical management, 

critical care will be signing off at this time, please reconsult if needed.











Raulito Alcantar MD Mar 2, 2018 15:11

## 2018-03-03 VITALS — OXYGEN SATURATION: 97 %

## 2018-03-03 VITALS — OXYGEN SATURATION: 94 %

## 2018-03-03 VITALS
OXYGEN SATURATION: 94 % | DIASTOLIC BLOOD PRESSURE: 85 MMHG | RESPIRATION RATE: 22 BRPM | TEMPERATURE: 97.5 F | HEART RATE: 107 BPM | SYSTOLIC BLOOD PRESSURE: 151 MMHG

## 2018-03-03 VITALS
SYSTOLIC BLOOD PRESSURE: 207 MMHG | HEART RATE: 92 BPM | RESPIRATION RATE: 31 BRPM | DIASTOLIC BLOOD PRESSURE: 104 MMHG | OXYGEN SATURATION: 92 %

## 2018-03-03 VITALS
OXYGEN SATURATION: 91 % | HEART RATE: 114 BPM | DIASTOLIC BLOOD PRESSURE: 73 MMHG | RESPIRATION RATE: 20 BRPM | TEMPERATURE: 97.9 F | SYSTOLIC BLOOD PRESSURE: 148 MMHG

## 2018-03-03 VITALS
TEMPERATURE: 98 F | RESPIRATION RATE: 22 BRPM | OXYGEN SATURATION: 94 % | SYSTOLIC BLOOD PRESSURE: 177 MMHG | HEART RATE: 99 BPM | DIASTOLIC BLOOD PRESSURE: 85 MMHG

## 2018-03-03 VITALS
RESPIRATION RATE: 22 BRPM | TEMPERATURE: 97.5 F | SYSTOLIC BLOOD PRESSURE: 160 MMHG | DIASTOLIC BLOOD PRESSURE: 90 MMHG | OXYGEN SATURATION: 94 % | HEART RATE: 99 BPM

## 2018-03-03 VITALS
DIASTOLIC BLOOD PRESSURE: 84 MMHG | HEART RATE: 94 BPM | OXYGEN SATURATION: 94 % | TEMPERATURE: 98 F | RESPIRATION RATE: 22 BRPM | SYSTOLIC BLOOD PRESSURE: 146 MMHG

## 2018-03-03 VITALS — OXYGEN SATURATION: 96 %

## 2018-03-03 VITALS — HEART RATE: 108 BPM

## 2018-03-03 VITALS — HEART RATE: 99 BPM

## 2018-03-03 VITALS — HEART RATE: 113 BPM

## 2018-03-03 VITALS — OXYGEN SATURATION: 98 %

## 2018-03-03 VITALS — HEART RATE: 112 BPM

## 2018-03-03 VITALS — OXYGEN SATURATION: 95 %

## 2018-03-03 LAB
BASOPHILS # BLD AUTO: 0 TH/MM3 (ref 0–0.2)
BASOPHILS NFR BLD: 0.1 % (ref 0–2)
BUN SERPL-MCNC: 34 MG/DL (ref 7–18)
CALCIUM SERPL-MCNC: 8.3 MG/DL (ref 8.5–10.1)
CHLORIDE SERPL-SCNC: 117 MEQ/L (ref 98–107)
CREAT SERPL-MCNC: 0.89 MG/DL (ref 0.5–1)
EOSINOPHIL # BLD: 0 TH/MM3 (ref 0–0.4)
EOSINOPHIL NFR BLD: 0 % (ref 0–4)
ERYTHROCYTE [DISTWIDTH] IN BLOOD BY AUTOMATED COUNT: 17.5 % (ref 11.6–17.2)
GFR SERPLBLD BASED ON 1.73 SQ M-ARVRAT: 64 ML/MIN (ref 89–?)
GLUCOSE SERPL-MCNC: 229 MG/DL (ref 74–106)
HCO3 BLD-SCNC: 21 MEQ/L (ref 21–32)
HCT VFR BLD CALC: 43.2 % (ref 35–46)
HGB BLD-MCNC: 14.9 GM/DL (ref 11.6–15.3)
LYMPHOCYTES # BLD AUTO: 1 TH/MM3 (ref 1–4.8)
LYMPHOCYTES NFR BLD AUTO: 5.3 % (ref 9–44)
LYMPHOCYTES: 2 % (ref 9–44)
MAGNESIUM SERPL-MCNC: 2 MG/DL (ref 1.5–2.5)
MCH RBC QN AUTO: 31.7 PG (ref 27–34)
MCHC RBC AUTO-ENTMCNC: 34.5 % (ref 32–36)
MCV RBC AUTO: 91.7 FL (ref 80–100)
MONOCYTE #: 0.9 TH/MM3 (ref 0–0.9)
MONOCYTES NFR BLD: 4.5 % (ref 0–8)
MONOCYTES: 4 % (ref 0–8)
MYELOCYTES NFR BLD: 5 % (ref 0–0)
NEUTROPHILS # BLD AUTO: 17.5 TH/MM3 (ref 1.8–7.7)
NEUTROPHILS NFR BLD AUTO: 90.1 % (ref 16–70)
NEUTS BAND # BLD MANUAL: 18.2 TH/MM3 (ref 1.8–7.7)
NEUTS BAND NFR BLD: 3 % (ref 0–6)
NEUTS SEG NFR BLD MANUAL: 86 % (ref 16–70)
PHOSPHATE SERPL-MCNC: 2.1 MG/DL (ref 2.5–4.9)
PLATELET # BLD: 250 TH/MM3 (ref 150–450)
PMV BLD AUTO: 9.9 FL (ref 7–11)
RBC # BLD AUTO: 4.71 MIL/MM3 (ref 4–5.3)
SODIUM SERPL-SCNC: 145 MEQ/L (ref 136–145)
T4 FREE SERPL-MCNC: 1.2 NG/DL (ref 0.76–1.46)
TROPONIN I SERPL-MCNC: 0.02 NG/ML (ref 0.02–0.05)
WBC # BLD AUTO: 19.4 TH/MM3 (ref 4–11)

## 2018-03-03 RX ADMIN — OSELTAMIVIR PHOSPHATE SCH MG: 75 CAPSULE ORAL at 08:48

## 2018-03-03 RX ADMIN — CLOPIDOGREL BISULFATE SCH MG: 75 TABLET, FILM COATED ORAL at 08:48

## 2018-03-03 RX ADMIN — AMIODARONE HYDROCHLORIDE SCH MG: 200 TABLET ORAL at 08:48

## 2018-03-03 RX ADMIN — MONTELUKAST SODIUM SCH MG: 10 TABLET, COATED ORAL at 21:00

## 2018-03-03 RX ADMIN — MORPHINE SULFATE PRN MG: 2 INJECTION, SOLUTION INTRAMUSCULAR; INTRAVENOUS at 00:57

## 2018-03-03 RX ADMIN — INSULIN ASPART SCH: 100 INJECTION, SOLUTION INTRAVENOUS; SUBCUTANEOUS at 20:00

## 2018-03-03 RX ADMIN — Medication SCH ML: at 21:53

## 2018-03-03 RX ADMIN — INSULIN ASPART SCH: 100 INJECTION, SOLUTION INTRAVENOUS; SUBCUTANEOUS at 16:00

## 2018-03-03 RX ADMIN — SODIUM CHLORIDE PRN MLS/HR: 900 INJECTION, SOLUTION INTRAVENOUS at 17:58

## 2018-03-03 RX ADMIN — MORPHINE SULFATE PRN MG: 2 INJECTION, SOLUTION INTRAMUSCULAR; INTRAVENOUS at 13:20

## 2018-03-03 RX ADMIN — TAZOBACTAM SODIUM AND PIPERACILLIN SODIUM SCH MLS/HR: 375; 3 INJECTION, SOLUTION INTRAVENOUS at 17:30

## 2018-03-03 RX ADMIN — INSULIN ASPART SCH: 100 INJECTION, SOLUTION INTRAVENOUS; SUBCUTANEOUS at 04:00

## 2018-03-03 RX ADMIN — INSULIN ASPART SCH: 100 INJECTION, SOLUTION INTRAVENOUS; SUBCUTANEOUS at 12:00

## 2018-03-03 RX ADMIN — LINEZOLID SCH MLS/HR: 600 INJECTION, SOLUTION INTRAVENOUS at 02:53

## 2018-03-03 RX ADMIN — ATORVASTATIN CALCIUM SCH MG: 80 TABLET, FILM COATED ORAL at 21:00

## 2018-03-03 RX ADMIN — TAZOBACTAM SODIUM AND PIPERACILLIN SODIUM SCH MLS/HR: 375; 3 INJECTION, SOLUTION INTRAVENOUS at 00:16

## 2018-03-03 RX ADMIN — FUROSEMIDE SCH MG: 10 INJECTION, SOLUTION INTRAMUSCULAR; INTRAVENOUS at 17:31

## 2018-03-03 RX ADMIN — IPRATROPIUM BROMIDE AND ALBUTEROL SULFATE SCH AMPULE: .5; 3 SOLUTION RESPIRATORY (INHALATION) at 19:23

## 2018-03-03 RX ADMIN — PHENYTOIN SODIUM SCH MLS/HR: 50 INJECTION INTRAMUSCULAR; INTRAVENOUS at 13:19

## 2018-03-03 RX ADMIN — IPRATROPIUM BROMIDE AND ALBUTEROL SULFATE SCH AMPULE: .5; 3 SOLUTION RESPIRATORY (INHALATION) at 03:57

## 2018-03-03 RX ADMIN — CHLORHEXIDINE GLUCONATE 0.12% ORAL RINSE SCH ML: 1.2 LIQUID ORAL at 20:00

## 2018-03-03 RX ADMIN — IPRATROPIUM BROMIDE AND ALBUTEROL SULFATE SCH AMPULE: .5; 3 SOLUTION RESPIRATORY (INHALATION) at 10:49

## 2018-03-03 RX ADMIN — TAZOBACTAM SODIUM AND PIPERACILLIN SODIUM SCH MLS/HR: 375; 3 INJECTION, SOLUTION INTRAVENOUS at 06:11

## 2018-03-03 RX ADMIN — IPRATROPIUM BROMIDE AND ALBUTEROL SULFATE SCH AMPULE: .5; 3 SOLUTION RESPIRATORY (INHALATION) at 23:53

## 2018-03-03 RX ADMIN — CHLORHEXIDINE GLUCONATE SCH PACK: 500 CLOTH TOPICAL at 02:53

## 2018-03-03 RX ADMIN — IPRATROPIUM BROMIDE AND ALBUTEROL SULFATE SCH AMPULE: .5; 3 SOLUTION RESPIRATORY (INHALATION) at 07:31

## 2018-03-03 RX ADMIN — ASPIRIN SCH MG: 81 TABLET ORAL at 08:48

## 2018-03-03 RX ADMIN — AMIODARONE HYDROCHLORIDE SCH MG: 200 TABLET ORAL at 21:00

## 2018-03-03 RX ADMIN — ENOXAPARIN SODIUM SCH MG: 40 INJECTION SUBCUTANEOUS at 15:00

## 2018-03-03 RX ADMIN — IPRATROPIUM BROMIDE AND ALBUTEROL SULFATE SCH AMPULE: .5; 3 SOLUTION RESPIRATORY (INHALATION) at 00:03

## 2018-03-03 RX ADMIN — INSULIN ASPART SCH: 100 INJECTION, SOLUTION INTRAVENOUS; SUBCUTANEOUS at 00:00

## 2018-03-03 RX ADMIN — FAMOTIDINE SCH MG: 10 INJECTION, SOLUTION INTRAVENOUS at 02:53

## 2018-03-03 RX ADMIN — CHLORHEXIDINE GLUCONATE 0.12% ORAL RINSE SCH ML: 1.2 LIQUID ORAL at 08:00

## 2018-03-03 RX ADMIN — LINEZOLID SCH MLS/HR: 600 INJECTION, SOLUTION INTRAVENOUS at 13:20

## 2018-03-03 RX ADMIN — Medication SCH ML: at 08:49

## 2018-03-03 RX ADMIN — CARVEDILOL SCH MG: 12.5 TABLET, FILM COATED ORAL at 21:00

## 2018-03-03 RX ADMIN — OSELTAMIVIR PHOSPHATE SCH MG: 75 CAPSULE ORAL at 21:00

## 2018-03-03 RX ADMIN — MORPHINE SULFATE PRN MG: 2 INJECTION, SOLUTION INTRAMUSCULAR; INTRAVENOUS at 03:46

## 2018-03-03 RX ADMIN — IPRATROPIUM BROMIDE AND ALBUTEROL SULFATE SCH AMPULE: .5; 3 SOLUTION RESPIRATORY (INHALATION) at 14:58

## 2018-03-03 RX ADMIN — TAZOBACTAM SODIUM AND PIPERACILLIN SODIUM SCH MLS/HR: 375; 3 INJECTION, SOLUTION INTRAVENOUS at 13:19

## 2018-03-03 RX ADMIN — FAMOTIDINE SCH MG: 10 INJECTION, SOLUTION INTRAVENOUS at 15:00

## 2018-03-03 RX ADMIN — CARVEDILOL SCH MG: 12.5 TABLET, FILM COATED ORAL at 08:49

## 2018-03-03 RX ADMIN — INSULIN ASPART SCH: 100 INJECTION, SOLUTION INTRAVENOUS; SUBCUTANEOUS at 08:00

## 2018-03-03 RX ADMIN — ABACAVIR SULFATE SCH MG: 300 TABLET, FILM COATED ORAL at 08:47

## 2018-03-03 NOTE — RADRPT
EXAM DATE/TIME:  03/03/2018 16:36 

 

[facility] COMPARISON:     

CHEST SINGLE AP, March 01, 2018, 3:38.

 

                     

INDICATIONS :     

Respiratory failure.

                     

 

MEDICAL HISTORY :            

Hypertension. Myocardial infarction. Stroke. A-Fib Diabetes   

 

SURGICAL HISTORY :        

Left shoulder.

 

ENCOUNTER:     

Subsequent                                        

 

ACUITY:     

4 - 6 days      

 

PAIN SCORE:     

Non-responsive.

 

LOCATION:     

Bilateral chest 

 

FINDINGS:     

Deterioration in the appearance of the chest with cardiomegaly and increasing interstitial changes.  
There is no pneumothorax. The portion of the bony skeleton visualized is unremarkable.

 

 

CONCLUSION:     Deterioration with increasing interstitial changes both lungs 

 

 

 Lito Salomon MD FACR on March 03, 2018 at 16:58           

Board Certified Radiologist.

 This report was verified electronically.

## 2018-03-03 NOTE — HHI.IDPN
Subjective


Subjective


Remarks


doing worse , Back on BIPAP 50% 10/5 


Lethargic


not expectorating


cont to have liquid stool


BC growing coag neg stapn and bacillus in diferent sets


Antibiotics


Abacavir, Epivir, Sustiva, NOrvir





zyvox


zosyn


tamiflu


Allergies:  


Coded Allergies:  


     acetaminophen (Unverified  Allergy, Severe, GI UPSET, 8/15/17)


     hydrocodone (Unverified  Allergy, Severe, GI UPSET, 8/15/17)





Objective


.





Vital Signs








  Date Time  Temp Pulse Resp B/P (MAP) Pulse Ox O2 Delivery O2 Flow Rate FiO2


 


3/3/18 14:58     94   50


 


3/3/18 14:00  113      


 


3/3/18 12:00  99      


 


3/3/18 12:00 98.0 110 22 177/85 (115) 94   


 


3/3/18 10:50     98   50


 


3/3/18 10:00  99      


 


3/3/18 08:00 97.5 107 22 160/90 (113) 94   


 


3/3/18 08:00  99      


 


3/3/18 07:32     96   50


 


3/3/18 06:00  108      


 


3/3/18 04:00  107      


 


3/3/18 04:00 97.5 107 22 151/85 (107) 94   


 


3/3/18 03:57     95   50


 


3/3/18 02:00  108      


 


3/3/18 00:59     97 Non-Rebreather 15.00 100


 


3/3/18 00:38     95   50


 


3/3/18 00:00  92 31 207/104 (138) 92   


 


3/3/18 00:00  92      


 


3/2/18 22:00  94      


 


3/2/18 20:02  96 32 190/89 (122) 93   


 


3/2/18 20:00  99      


 


3/2/18 20:00 97.5 99 36 181/103 (129) 94   


 


3/2/18 19:38     93 Nasal Cannula 6.00 


 


3/2/18 19:00     95 Nasal Cannula 6.00 


 


3/2/18 18:00  94      








.





Laboratory Tests








Test


  3/2/18


04:00


 


White Blood Count 9.1 TH/MM3 


 


Red Blood Count 4.01 MIL/MM3 


 


Hemoglobin 12.9 GM/DL 


 


Hematocrit 36.9 % 


 


Mean Corpuscular Volume 92.1 FL 


 


Mean Corpuscular Hemoglobin 32.1 PG 


 


Mean Corpuscular Hemoglobin


Concent 34.9 % 


 


 


Red Cell Distribution Width 17.7 % 


 


Platelet Count 137 TH/MM3 


 


Mean Platelet Volume 10.9 FL 


 


Neutrophils (%) (Auto) 86.9 % 


 


Lymphocytes (%) (Auto) 7.8 % 


 


Monocytes (%) (Auto) 5.2 % 


 


Eosinophils (%) (Auto) 0.0 % 


 


Basophils (%) (Auto) 0.1 % 


 


Neutrophils # (Auto) 7.9 TH/MM3 


 


Lymphocytes # (Auto) 0.7 TH/MM3 


 


Monocytes # (Auto) 0.5 TH/MM3 


 


Eosinophils # (Auto) 0.0 TH/MM3 


 


Basophils # (Auto) 0.0 TH/MM3 


 


CBC Comment AUTO DIFF 


 


Differential Comment


  AUTO DIFF


CONFIRMED








Laboratory Tests








Test


  3/2/18


04:00


 


Blood Urea Nitrogen 40 MG/DL 


 


Creatinine 0.85 MG/DL 


 


Random Glucose 160 MG/DL 


 


Total Protein 6.2 GM/DL 


 


Albumin 2.1 GM/DL 


 


Calcium Level 8.0 MG/DL 


 


Alkaline Phosphatase 98 U/L 


 


Aspartate Amino Transf


(AST/SGOT) 67 U/L 


 


 


Alanine Aminotransferase


(ALT/SGPT) 117 U/L 


 


 


Total Bilirubin 0.3 MG/DL 


 


Sodium Level 143 MEQ/L 


 


Potassium Level 2.7 MEQ/L 


 


Chloride Level 115 MEQ/L 


 


Carbon Dioxide Level 18.2 MEQ/L 


 


Anion Gap 10 MEQ/L 


 


Estimat Glomerular Filtration


Rate 67 ML/MIN 


 








Imaging


  


Last Impressions








Chest X-Ray 3/1/18 0600 Signed





Impressions: 





 Service Date/Time:  Thursday, March 1, 2018 03:38 - CONCLUSION:  1. Slightly 





 more prominent patchy airspace disease in the left midlung zone. 2. Stable 





 patchy airspace disease in the right mid to lower lung zones and left lower 

lung 





 zone.     Homero Dawkins MD 


 


Liver Ultrasound 2/28/18 0000 Signed





Impressions: 





 Service Date/Time:  Wednesday, February 28, 2018 13:48 - CONCLUSION:  1. Mild 





 increased echotexture of the liver may indicate steatosis. 2. Cholelithiasis. 

   





  Yg Rodarte MD 


 


Chest CT 2/28/18 0000 Signed





Impressions: 





 Service Date/Time:  Wednesday, February 28, 2018 13:27 - CONCLUSION:  Patchy 





 airspace disease scattered consolidation both lungs.  No pleural effusion.    

  





 Lito Salomon MD  FACR








Physical Exam


CONSTITUTIONAL/GENERAL: This is a morbidly obese patient, in mild  resp 

distress.


 


TUBES/LINES/DRAINS:


SKIN: No jaundice, rashes, or lesions.   Skin temperature appropriate. Not 

diaphoretic. 


BREASTS: mastectomy scar L breast - well healed


HEAD: Atraumatic. Normocephalic.


EYES: Pupils equal and round and reactive. Extraocular motions intact. No 

scleral icterus. No injection or drainage. Fundi not examined.


ENT: Hearing grossly normal. Nose without bleeding or purulent drainage. 

Limited mucosae exam without visible erythema, or lesions.





NECK: Trachea midline. Supple, nontender.  


CARDIOVASCULAR: Regular rate and rhythm without murmurs, gallops, or rubs. No 

JVD. Peripheral pulses symmetric. Perifery well perfused 


RESPIRATORY/CHEST: Symmetric, unlabored respirations. cont to haveiffuse b/l  

rhonchi to auscultation. Breath sounds equal bilaterally.   


GASTROINTESTINAL: Abdomen soft, non-tender, distended. No hepato-splenomegaly, 

or palpable masses. No guarding. Bowel sounds present.


GENITOURINARY: Without palpable bladder distension. 


MUSCULOSKELETAL: Extremities without clubbing, cyanosis, or edema.  


NEUROLOGICAL: Very lethargic but arousable


Motor and sensory grossly within normal limits. Follows commands. Clear speech 

Moves all extremities.


PSYCHIATRIC: cooperativ





Assessment & Plan


Remarks


Assessment and Plan


Influenza


 PNA


Impending resp failure: requires non ivasive vent'n 


HIV, unknown immunosuppression status


Morbid obesety


COPD


Chronic tobaccoism


Abx associated  diarrhea - new issue 


Gram positive bactremia 2/2 different organisms - cw contamination





   cont current abx


   cont tamiflu


   cont  HAART  


   fu sputum clx


   fu blood clx


   chk stool for C.diff


repeat CXR


CD4


cytology for PCP











Maria Teresa Hammer MD Mar 3, 2018 16:17

## 2018-03-04 VITALS
RESPIRATION RATE: 20 BRPM | SYSTOLIC BLOOD PRESSURE: 159 MMHG | HEART RATE: 119 BPM | TEMPERATURE: 100 F | OXYGEN SATURATION: 100 % | DIASTOLIC BLOOD PRESSURE: 92 MMHG

## 2018-03-04 VITALS
TEMPERATURE: 100.3 F | HEART RATE: 144 BPM | SYSTOLIC BLOOD PRESSURE: 136 MMHG | DIASTOLIC BLOOD PRESSURE: 76 MMHG | OXYGEN SATURATION: 96 % | RESPIRATION RATE: 30 BRPM

## 2018-03-04 VITALS — OXYGEN SATURATION: 96 %

## 2018-03-04 VITALS — HEART RATE: 109 BPM

## 2018-03-04 VITALS — OXYGEN SATURATION: 95 %

## 2018-03-04 VITALS
OXYGEN SATURATION: 91 % | SYSTOLIC BLOOD PRESSURE: 129 MMHG | TEMPERATURE: 99.4 F | HEART RATE: 112 BPM | DIASTOLIC BLOOD PRESSURE: 78 MMHG | RESPIRATION RATE: 18 BRPM

## 2018-03-04 VITALS
SYSTOLIC BLOOD PRESSURE: 150 MMHG | DIASTOLIC BLOOD PRESSURE: 98 MMHG | RESPIRATION RATE: 22 BRPM | HEART RATE: 115 BPM | TEMPERATURE: 99.8 F | OXYGEN SATURATION: 98 %

## 2018-03-04 VITALS
HEART RATE: 109 BPM | DIASTOLIC BLOOD PRESSURE: 85 MMHG | RESPIRATION RATE: 20 BRPM | SYSTOLIC BLOOD PRESSURE: 179 MMHG | TEMPERATURE: 99 F | OXYGEN SATURATION: 97 %

## 2018-03-04 VITALS — OXYGEN SATURATION: 93 %

## 2018-03-04 VITALS — OXYGEN SATURATION: 97 % | HEART RATE: 112 BPM | RESPIRATION RATE: 23 BRPM

## 2018-03-04 VITALS — HEART RATE: 140 BPM

## 2018-03-04 VITALS
HEART RATE: 116 BPM | SYSTOLIC BLOOD PRESSURE: 163 MMHG | OXYGEN SATURATION: 94 % | RESPIRATION RATE: 20 BRPM | DIASTOLIC BLOOD PRESSURE: 100 MMHG | TEMPERATURE: 99.2 F

## 2018-03-04 VITALS — OXYGEN SATURATION: 97 %

## 2018-03-04 VITALS — HEART RATE: 115 BPM

## 2018-03-04 VITALS
SYSTOLIC BLOOD PRESSURE: 154 MMHG | DIASTOLIC BLOOD PRESSURE: 83 MMHG | RESPIRATION RATE: 19 BRPM | OXYGEN SATURATION: 95 % | HEART RATE: 114 BPM

## 2018-03-04 VITALS — OXYGEN SATURATION: 97 % | HEART RATE: 117 BPM | RESPIRATION RATE: 23 BRPM

## 2018-03-04 LAB
ALBUMIN SERPL-MCNC: 2.6 GM/DL (ref 3.4–5)
ALP SERPL-CCNC: 108 U/L (ref 45–117)
ALT SERPL-CCNC: 69 U/L (ref 10–53)
AST SERPL-CCNC: 30 U/L (ref 15–37)
BASOPHILS # BLD AUTO: 0 TH/MM3 (ref 0–0.2)
BASOPHILS NFR BLD: 0.1 % (ref 0–2)
BILIRUB SERPL-MCNC: 0.4 MG/DL (ref 0.2–1)
BUN SERPL-MCNC: 41 MG/DL (ref 7–18)
CALCIUM SERPL-MCNC: 8.8 MG/DL (ref 8.5–10.1)
CHLORIDE SERPL-SCNC: 116 MEQ/L (ref 98–107)
COLOR UR: YELLOW
CREAT SERPL-MCNC: 0.97 MG/DL (ref 0.5–1)
EOSINOPHIL # BLD: 0 TH/MM3 (ref 0–0.4)
EOSINOPHIL NFR BLD: 0 % (ref 0–4)
ERYTHROCYTE [DISTWIDTH] IN BLOOD BY AUTOMATED COUNT: 17.8 % (ref 11.6–17.2)
GFR SERPLBLD BASED ON 1.73 SQ M-ARVRAT: 58 ML/MIN (ref 89–?)
GLUCOSE SERPL-MCNC: 208 MG/DL (ref 74–106)
GLUCOSE UR STRIP-MCNC: (no result) MG/DL
HCO3 BLD-SCNC: 21.6 MEQ/L (ref 21–32)
HCT VFR BLD CALC: 44.6 % (ref 35–46)
HGB BLD-MCNC: 15.4 GM/DL (ref 11.6–15.3)
HGB UR QL STRIP: (no result)
HYALINE CASTS #/AREA URNS LPF: 3 /LPF
KETONES UR STRIP-MCNC: (no result) MG/DL
LYMPHOCYTES # BLD AUTO: 1 TH/MM3 (ref 1–4.8)
LYMPHOCYTES NFR BLD AUTO: 6.1 % (ref 9–44)
MAGNESIUM SERPL-MCNC: 2 MG/DL (ref 1.5–2.5)
MCH RBC QN AUTO: 31.8 PG (ref 27–34)
MCHC RBC AUTO-ENTMCNC: 34.7 % (ref 32–36)
MCV RBC AUTO: 91.8 FL (ref 80–100)
MONOCYTE #: 1.2 TH/MM3 (ref 0–0.9)
MONOCYTES NFR BLD: 7.5 % (ref 0–8)
NEUTROPHILS # BLD AUTO: 14.2 TH/MM3 (ref 1.8–7.7)
NEUTROPHILS NFR BLD AUTO: 86.3 % (ref 16–70)
NITRITE UR QL STRIP: (no result)
PHOSPHATE SERPL-MCNC: 1.7 MG/DL (ref 2.5–4.9)
PLATELET # BLD: 246 TH/MM3 (ref 150–450)
PMV BLD AUTO: 9.7 FL (ref 7–11)
PROT SERPL-MCNC: 7.3 GM/DL (ref 6.4–8.2)
RBC # BLD AUTO: 4.85 MIL/MM3 (ref 4–5.3)
SODIUM SERPL-SCNC: 149 MEQ/L (ref 136–145)
SP GR UR STRIP: 1.01 (ref 1–1.03)
SQUAMOUS #/AREA URNS HPF: 2 /HPF (ref 0–5)
TROPONIN I SERPL-MCNC: 0.02 NG/ML (ref 0.02–0.05)
TROPONIN I SERPL-MCNC: 0.02 NG/ML (ref 0.02–0.05)
URINE LEUKOCYTE ESTERASE: (no result)
WBC # BLD AUTO: 16.5 TH/MM3 (ref 4–11)

## 2018-03-04 RX ADMIN — IPRATROPIUM BROMIDE AND ALBUTEROL SULFATE SCH AMPULE: .5; 3 SOLUTION RESPIRATORY (INHALATION) at 14:02

## 2018-03-04 RX ADMIN — FUROSEMIDE SCH MG: 10 INJECTION, SOLUTION INTRAMUSCULAR; INTRAVENOUS at 18:00

## 2018-03-04 RX ADMIN — CLOPIDOGREL BISULFATE SCH MG: 75 TABLET, FILM COATED ORAL at 08:58

## 2018-03-04 RX ADMIN — OSELTAMIVIR PHOSPHATE SCH MG: 75 CAPSULE ORAL at 08:58

## 2018-03-04 RX ADMIN — TAZOBACTAM SODIUM AND PIPERACILLIN SODIUM SCH MLS/HR: 375; 3 INJECTION, SOLUTION INTRAVENOUS at 20:08

## 2018-03-04 RX ADMIN — CHLORHEXIDINE GLUCONATE 0.12% ORAL RINSE SCH ML: 1.2 LIQUID ORAL at 20:00

## 2018-03-04 RX ADMIN — CHLORHEXIDINE GLUCONATE 0.12% ORAL RINSE SCH ML: 1.2 LIQUID ORAL at 08:00

## 2018-03-04 RX ADMIN — IPRATROPIUM BROMIDE AND ALBUTEROL SULFATE SCH AMPULE: .5; 3 SOLUTION RESPIRATORY (INHALATION) at 02:59

## 2018-03-04 RX ADMIN — ASPIRIN SCH MG: 81 TABLET ORAL at 08:58

## 2018-03-04 RX ADMIN — FAMOTIDINE SCH MG: 10 INJECTION, SOLUTION INTRAVENOUS at 01:28

## 2018-03-04 RX ADMIN — FAMOTIDINE SCH MG: 10 INJECTION, SOLUTION INTRAVENOUS at 15:43

## 2018-03-04 RX ADMIN — TAZOBACTAM SODIUM AND PIPERACILLIN SODIUM SCH MLS/HR: 375; 3 INJECTION, SOLUTION INTRAVENOUS at 17:00

## 2018-03-04 RX ADMIN — ATORVASTATIN CALCIUM SCH MG: 80 TABLET, FILM COATED ORAL at 21:00

## 2018-03-04 RX ADMIN — CARVEDILOL SCH MG: 12.5 TABLET, FILM COATED ORAL at 21:00

## 2018-03-04 RX ADMIN — IPRATROPIUM BROMIDE AND ALBUTEROL SULFATE SCH AMPULE: .5; 3 SOLUTION RESPIRATORY (INHALATION) at 08:42

## 2018-03-04 RX ADMIN — POTASSIUM CHLORIDE PRN MLS/HR: 200 INJECTION, SOLUTION INTRAVENOUS at 22:28

## 2018-03-04 RX ADMIN — Medication SCH ML: at 09:00

## 2018-03-04 RX ADMIN — SODIUM CHLORIDE PRN MLS/HR: 900 INJECTION, SOLUTION INTRAVENOUS at 06:46

## 2018-03-04 RX ADMIN — AMIODARONE HYDROCHLORIDE SCH MG: 200 TABLET ORAL at 21:00

## 2018-03-04 RX ADMIN — ABACAVIR SULFATE SCH MG: 300 TABLET, FILM COATED ORAL at 08:59

## 2018-03-04 RX ADMIN — LINEZOLID SCH MLS/HR: 600 INJECTION, SOLUTION INTRAVENOUS at 12:53

## 2018-03-04 RX ADMIN — Medication SCH ML: at 21:17

## 2018-03-04 RX ADMIN — OSELTAMIVIR PHOSPHATE SCH MG: 75 CAPSULE ORAL at 21:00

## 2018-03-04 RX ADMIN — INSULIN ASPART SCH: 100 INJECTION, SOLUTION INTRAVENOUS; SUBCUTANEOUS at 16:00

## 2018-03-04 RX ADMIN — MORPHINE SULFATE PRN MG: 2 INJECTION, SOLUTION INTRAMUSCULAR; INTRAVENOUS at 19:59

## 2018-03-04 RX ADMIN — INSULIN ASPART SCH: 100 INJECTION, SOLUTION INTRAVENOUS; SUBCUTANEOUS at 04:00

## 2018-03-04 RX ADMIN — SODIUM CHLORIDE PRN MLS/HR: 900 INJECTION, SOLUTION INTRAVENOUS at 15:58

## 2018-03-04 RX ADMIN — INSULIN ASPART SCH: 100 INJECTION, SOLUTION INTRAVENOUS; SUBCUTANEOUS at 21:17

## 2018-03-04 RX ADMIN — IPRATROPIUM BROMIDE AND ALBUTEROL SULFATE SCH AMPULE: .5; 3 SOLUTION RESPIRATORY (INHALATION) at 11:51

## 2018-03-04 RX ADMIN — TAZOBACTAM SODIUM AND PIPERACILLIN SODIUM SCH MLS/HR: 375; 3 INJECTION, SOLUTION INTRAVENOUS at 11:00

## 2018-03-04 RX ADMIN — TAZOBACTAM SODIUM AND PIPERACILLIN SODIUM SCH MLS/HR: 375; 3 INJECTION, SOLUTION INTRAVENOUS at 04:10

## 2018-03-04 RX ADMIN — CHLORHEXIDINE GLUCONATE SCH PACK: 500 CLOTH TOPICAL at 03:51

## 2018-03-04 RX ADMIN — CARVEDILOL SCH MG: 12.5 TABLET, FILM COATED ORAL at 08:58

## 2018-03-04 RX ADMIN — FUROSEMIDE SCH MG: 10 INJECTION, SOLUTION INTRAMUSCULAR; INTRAVENOUS at 08:59

## 2018-03-04 RX ADMIN — TAZOBACTAM SODIUM AND PIPERACILLIN SODIUM SCH MLS/HR: 375; 3 INJECTION, SOLUTION INTRAVENOUS at 00:02

## 2018-03-04 RX ADMIN — INSULIN ASPART SCH: 100 INJECTION, SOLUTION INTRAVENOUS; SUBCUTANEOUS at 12:52

## 2018-03-04 RX ADMIN — INSULIN ASPART SCH: 100 INJECTION, SOLUTION INTRAVENOUS; SUBCUTANEOUS at 00:00

## 2018-03-04 RX ADMIN — MONTELUKAST SODIUM SCH MG: 10 TABLET, COATED ORAL at 21:00

## 2018-03-04 RX ADMIN — AMIODARONE HYDROCHLORIDE SCH MG: 200 TABLET ORAL at 08:58

## 2018-03-04 RX ADMIN — SODIUM CHLORIDE PRN MLS/HR: 900 INJECTION, SOLUTION INTRAVENOUS at 23:13

## 2018-03-04 RX ADMIN — LINEZOLID SCH MLS/HR: 600 INJECTION, SOLUTION INTRAVENOUS at 01:28

## 2018-03-04 RX ADMIN — IPRATROPIUM BROMIDE AND ALBUTEROL SULFATE PRN AMPULE: .5; 3 SOLUTION RESPIRATORY (INHALATION) at 19:25

## 2018-03-04 RX ADMIN — ENOXAPARIN SODIUM SCH MG: 40 INJECTION SUBCUTANEOUS at 15:43

## 2018-03-04 NOTE — RADRPT
EXAM DATE/TIME:  03/04/2018 09:04 

 

[facility] COMPARISON:     

CHEST SINGLE AP, March 03, 2018, 16:36.

 

                     

INDICATIONS :     

Respiratory failure. 

                     

 

MEDICAL HISTORY :            

Hypertension. Myocardial infarction. Stroke. A-Fib Diabetes.    

 

SURGICAL HISTORY :        

Left shoulder.

 

ENCOUNTER:     

Subsequent                                        

 

ACUITY:     

1 week      

 

PAIN SCORE:     

Non-responsive.

 

LOCATION:     

Bilateral chest 

 

FINDINGS:     

Heart is enlarged.  Mild interstitial change persists, improved in the interval there is no pneumotho
rax or significant effusion. The portion of the bony skeleton visualized is unremarkable.

 

CONCLUSION:     The improvement with less interstitial edema.  

 

 

 Lito Salomon MD FACR on March 04, 2018 at 9:44           

Board Certified Radiologist.

 This report was verified electronically.

## 2018-03-04 NOTE — EKG
Date Performed: 03/03/2018       Time Performed: 17:45:01

 

PTAGE:      63 years

 

EKG:      ATRIAL FIBRILLATION WITH RAPID VENTRICULAR RESPONSE POSSIBLE RIGHT VENTRICULAR HYPERTROPHY 
POSSIBLE ANTERIOR MYOCARDIAL INFARCTION , PROBABLY OLD ABNORMAL ECG

 

PREVIOUS TRACING       : 02/28/2018 10.37

 

DOCTOR:   Anthony Kaufman  Interpretating Date/Time  03/04/2018 10:20:59

## 2018-03-05 VITALS
DIASTOLIC BLOOD PRESSURE: 62 MMHG | HEART RATE: 132 BPM | RESPIRATION RATE: 23 BRPM | OXYGEN SATURATION: 97 % | TEMPERATURE: 99.3 F | SYSTOLIC BLOOD PRESSURE: 133 MMHG

## 2018-03-05 VITALS — OXYGEN SATURATION: 96 %

## 2018-03-05 VITALS
TEMPERATURE: 98.3 F | OXYGEN SATURATION: 96 % | SYSTOLIC BLOOD PRESSURE: 150 MMHG | DIASTOLIC BLOOD PRESSURE: 97 MMHG | HEART RATE: 127 BPM | RESPIRATION RATE: 30 BRPM

## 2018-03-05 VITALS — OXYGEN SATURATION: 97 %

## 2018-03-05 VITALS — HEART RATE: 128 BPM

## 2018-03-05 VITALS
TEMPERATURE: 99.1 F | OXYGEN SATURATION: 97 % | SYSTOLIC BLOOD PRESSURE: 156 MMHG | HEART RATE: 130 BPM | RESPIRATION RATE: 41 BRPM | DIASTOLIC BLOOD PRESSURE: 94 MMHG

## 2018-03-05 VITALS — HEART RATE: 129 BPM

## 2018-03-05 VITALS
TEMPERATURE: 98.8 F | OXYGEN SATURATION: 98 % | HEART RATE: 130 BPM | DIASTOLIC BLOOD PRESSURE: 85 MMHG | RESPIRATION RATE: 20 BRPM | SYSTOLIC BLOOD PRESSURE: 152 MMHG

## 2018-03-05 VITALS
TEMPERATURE: 99.6 F | OXYGEN SATURATION: 97 % | SYSTOLIC BLOOD PRESSURE: 165 MMHG | DIASTOLIC BLOOD PRESSURE: 83 MMHG | RESPIRATION RATE: 19 BRPM | HEART RATE: 129 BPM

## 2018-03-05 VITALS
RESPIRATION RATE: 24 BRPM | DIASTOLIC BLOOD PRESSURE: 70 MMHG | TEMPERATURE: 98 F | OXYGEN SATURATION: 96 % | HEART RATE: 124 BPM | SYSTOLIC BLOOD PRESSURE: 114 MMHG

## 2018-03-05 VITALS — OXYGEN SATURATION: 98 %

## 2018-03-05 VITALS — HEART RATE: 135 BPM

## 2018-03-05 VITALS — HEART RATE: 127 BPM

## 2018-03-05 VITALS — HEART RATE: 126 BPM

## 2018-03-05 LAB
ALBUMIN SERPL-MCNC: 2.6 GM/DL (ref 3.4–5)
ALP SERPL-CCNC: 107 U/L (ref 45–117)
ALT SERPL-CCNC: 61 U/L (ref 10–53)
AST SERPL-CCNC: 40 U/L (ref 15–37)
BASOPHILS # BLD AUTO: 0 TH/MM3 (ref 0–0.2)
BASOPHILS NFR BLD: 0.2 % (ref 0–2)
BILIRUB SERPL-MCNC: 0.6 MG/DL (ref 0.2–1)
BUN SERPL-MCNC: 44 MG/DL (ref 7–18)
CALCIUM SERPL-MCNC: 8.7 MG/DL (ref 8.5–10.1)
CHLORIDE SERPL-SCNC: 120 MEQ/L (ref 98–107)
CREAT SERPL-MCNC: 1 MG/DL (ref 0.5–1)
EOSINOPHIL # BLD: 0 TH/MM3 (ref 0–0.4)
EOSINOPHIL NFR BLD: 0 % (ref 0–4)
ERYTHROCYTE [DISTWIDTH] IN BLOOD BY AUTOMATED COUNT: 17.5 % (ref 11.6–17.2)
GFR SERPLBLD BASED ON 1.73 SQ M-ARVRAT: 56 ML/MIN (ref 89–?)
GLUCOSE SERPL-MCNC: 184 MG/DL (ref 74–106)
HCO3 BLD-SCNC: 19.7 MEQ/L (ref 21–32)
HCT VFR BLD CALC: 43.5 % (ref 35–46)
HGB BLD-MCNC: 15.1 GM/DL (ref 11.6–15.3)
LYMPHOCYTES # BLD AUTO: 0.8 TH/MM3 (ref 1–4.8)
LYMPHOCYTES NFR BLD AUTO: 3.7 % (ref 9–44)
LYMPHOCYTES: 6 % (ref 9–44)
MAGNESIUM SERPL-MCNC: 1.9 MG/DL (ref 1.5–2.5)
MCH RBC QN AUTO: 31.6 PG (ref 27–34)
MCHC RBC AUTO-ENTMCNC: 34.6 % (ref 32–36)
MCV RBC AUTO: 91.3 FL (ref 80–100)
MONOCYTE #: 1.1 TH/MM3 (ref 0–0.9)
MONOCYTES NFR BLD: 5.3 % (ref 0–8)
MONOCYTES: 6 % (ref 0–8)
MYELOCYTES NFR BLD: 2 % (ref 0–0)
NEUTROPHILS # BLD AUTO: 18.7 TH/MM3 (ref 1.8–7.7)
NEUTROPHILS NFR BLD AUTO: 90.8 % (ref 16–70)
NEUTS BAND # BLD MANUAL: 18.1 TH/MM3 (ref 1.8–7.7)
NEUTS SEG NFR BLD MANUAL: 86 % (ref 16–70)
NUCLEATED RED BLOOD CELL: 1 (ref 0–0)
PHOSPHATE SERPL-MCNC: 1.7 MG/DL (ref 2.5–4.9)
PLATELET # BLD: 241 TH/MM3 (ref 150–450)
PMV BLD AUTO: 9.8 FL (ref 7–11)
PROT SERPL-MCNC: 7.1 GM/DL (ref 6.4–8.2)
RBC # BLD AUTO: 4.77 MIL/MM3 (ref 4–5.3)
SODIUM SERPL-SCNC: 153 MEQ/L (ref 136–145)
TOXIC GRANULES BLD QL SMEAR: (no result)
WBC # BLD AUTO: 20.6 TH/MM3 (ref 4–11)
WBC NRBC COR # BLD: 1 /100 WBC (ref 0–0)

## 2018-03-05 RX ADMIN — SODIUM CHLORIDE PRN MLS/HR: 900 INJECTION, SOLUTION INTRAVENOUS at 09:17

## 2018-03-05 RX ADMIN — MORPHINE SULFATE PRN MG: 2 INJECTION, SOLUTION INTRAMUSCULAR; INTRAVENOUS at 09:36

## 2018-03-05 RX ADMIN — INSULIN ASPART SCH: 100 INJECTION, SOLUTION INTRAVENOUS; SUBCUTANEOUS at 04:00

## 2018-03-05 RX ADMIN — TAZOBACTAM SODIUM AND PIPERACILLIN SODIUM SCH MLS/HR: 375; 3 INJECTION, SOLUTION INTRAVENOUS at 17:19

## 2018-03-05 RX ADMIN — INSULIN ASPART SCH: 100 INJECTION, SOLUTION INTRAVENOUS; SUBCUTANEOUS at 17:19

## 2018-03-05 RX ADMIN — MORPHINE SULFATE PRN MG: 2 INJECTION, SOLUTION INTRAMUSCULAR; INTRAVENOUS at 20:20

## 2018-03-05 RX ADMIN — ENOXAPARIN SODIUM SCH MG: 40 INJECTION SUBCUTANEOUS at 14:40

## 2018-03-05 RX ADMIN — CHLORHEXIDINE GLUCONATE SCH PACK: 500 CLOTH TOPICAL at 04:00

## 2018-03-05 RX ADMIN — FAMOTIDINE SCH MG: 10 INJECTION, SOLUTION INTRAVENOUS at 04:14

## 2018-03-05 RX ADMIN — ASPIRIN SCH MG: 81 TABLET ORAL at 09:00

## 2018-03-05 RX ADMIN — INSULIN ASPART SCH: 100 INJECTION, SOLUTION INTRAVENOUS; SUBCUTANEOUS at 12:33

## 2018-03-05 RX ADMIN — LINEZOLID SCH MLS/HR: 600 INJECTION, SOLUTION INTRAVENOUS at 14:40

## 2018-03-05 RX ADMIN — TAZOBACTAM SODIUM AND PIPERACILLIN SODIUM SCH MLS/HR: 375; 3 INJECTION, SOLUTION INTRAVENOUS at 11:28

## 2018-03-05 RX ADMIN — MONTELUKAST SODIUM SCH MG: 10 TABLET, COATED ORAL at 19:49

## 2018-03-05 RX ADMIN — ATORVASTATIN CALCIUM SCH MG: 80 TABLET, FILM COATED ORAL at 19:49

## 2018-03-05 RX ADMIN — FAMOTIDINE SCH MG: 10 INJECTION, SOLUTION INTRAVENOUS at 14:40

## 2018-03-05 RX ADMIN — INSULIN ASPART SCH: 100 INJECTION, SOLUTION INTRAVENOUS; SUBCUTANEOUS at 23:05

## 2018-03-05 RX ADMIN — CARVEDILOL SCH MG: 12.5 TABLET, FILM COATED ORAL at 09:00

## 2018-03-05 RX ADMIN — OSELTAMIVIR PHOSPHATE SCH MG: 75 CAPSULE ORAL at 19:50

## 2018-03-05 RX ADMIN — ABACAVIR SULFATE SCH MG: 300 TABLET, FILM COATED ORAL at 09:00

## 2018-03-05 RX ADMIN — TAZOBACTAM SODIUM AND PIPERACILLIN SODIUM SCH MLS/HR: 375; 3 INJECTION, SOLUTION INTRAVENOUS at 04:13

## 2018-03-05 RX ADMIN — POTASSIUM CHLORIDE PRN MLS/HR: 200 INJECTION, SOLUTION INTRAVENOUS at 00:37

## 2018-03-05 RX ADMIN — FUROSEMIDE SCH MG: 10 INJECTION, SOLUTION INTRAMUSCULAR; INTRAVENOUS at 17:20

## 2018-03-05 RX ADMIN — INSULIN ASPART SCH: 100 INJECTION, SOLUTION INTRAVENOUS; SUBCUTANEOUS at 09:09

## 2018-03-05 RX ADMIN — CHLORHEXIDINE GLUCONATE 0.12% ORAL RINSE SCH ML: 1.2 LIQUID ORAL at 08:00

## 2018-03-05 RX ADMIN — LINEZOLID SCH MLS/HR: 600 INJECTION, SOLUTION INTRAVENOUS at 00:37

## 2018-03-05 RX ADMIN — AMIODARONE HYDROCHLORIDE SCH MG: 200 TABLET ORAL at 09:00

## 2018-03-05 RX ADMIN — TAZOBACTAM SODIUM AND PIPERACILLIN SODIUM SCH MLS/HR: 375; 3 INJECTION, SOLUTION INTRAVENOUS at 23:00

## 2018-03-05 RX ADMIN — SODIUM CHLORIDE PRN MLS/HR: 900 INJECTION, SOLUTION INTRAVENOUS at 17:49

## 2018-03-05 RX ADMIN — INSULIN ASPART SCH: 100 INJECTION, SOLUTION INTRAVENOUS; SUBCUTANEOUS at 20:19

## 2018-03-05 RX ADMIN — CLOPIDOGREL BISULFATE SCH MG: 75 TABLET, FILM COATED ORAL at 09:00

## 2018-03-05 RX ADMIN — OSELTAMIVIR PHOSPHATE SCH MG: 75 CAPSULE ORAL at 09:00

## 2018-03-05 RX ADMIN — Medication SCH ML: at 19:49

## 2018-03-05 RX ADMIN — CHLORHEXIDINE GLUCONATE 0.12% ORAL RINSE SCH ML: 1.2 LIQUID ORAL at 19:50

## 2018-03-05 RX ADMIN — INSULIN ASPART SCH: 100 INJECTION, SOLUTION INTRAVENOUS; SUBCUTANEOUS at 00:00

## 2018-03-05 RX ADMIN — AMIODARONE HYDROCHLORIDE SCH MG: 200 TABLET ORAL at 19:49

## 2018-03-05 RX ADMIN — CARVEDILOL SCH MG: 12.5 TABLET, FILM COATED ORAL at 19:49

## 2018-03-05 RX ADMIN — FUROSEMIDE SCH MG: 10 INJECTION, SOLUTION INTRAMUSCULAR; INTRAVENOUS at 09:06

## 2018-03-05 RX ADMIN — Medication SCH ML: at 09:08

## 2018-03-05 NOTE — PD.CONS
Consult


Service


Palliative Care


Consult Requested By


Dr. Alves


Primary Care Physician


Sid Mendoza MD


Reason for Consultation


   a.  To assist with evaluation and management of symptoms including: 

shortness of breath, debility


   b.  To assist medical decision maker(s) with: better understanding of 

current medical conditions; weighing benefits/burdens of medical treatment 

options; making        


        medical treatment decisions.





HPI


History of Present Illness


Mrs Skaggs is a 63 years old female with a past medical history of asthma, 

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 

chronic atrial fibrillation, carotid stenosis, hypertension, hepatitis C, HIV 

on HAART and GERD. Patient was brought to the ER on 18 from a nursing home 

after she was noted to be in worsening respiratory distress and low grade 

fever. Patient was being treated with Tamiflu for influenza and was on day 5 

when she came to the ER.  


ER Course: 


* Vital signs: Temperature 100.7, pulse 130, respirations 27, /58, 


* Laboratory workup revealed WBC 12.2, hemoglobin 15.3, hematocrit 44.3, 

platelet count 170, BUN/creatinine 46/1.48, lactic acid 1.6, , , 

BNP 10


* Chest x-ray revealed mild to moderate congestive failure


* Chest CT revealed patchy airspace disease scattered consolidation in both 

lungs, no pleural effusion.


* Patient is a DNR she was placed on BiPAP with saturation of 95% on 70% FiO2


* Blood cultures obtained


* IV Solu-Medrol and IV Zosyn administered


* Liver ultrasound revealed mild increased echotexture of the liver which may 

indicate steatosis.


* EKG revealed atrial fibrillation with rapid ventricular response


* Patient is a DNR


* Patient admitted for further evaluation





Critical care Dr. Ronquillo consulted.  Infectious disease Dr. Hammer consulted  for evaluation and management of HIV and pneumonia.  Blood cultures 

growing coag negative Staphylococcus and bacillus species not anthracis in 

different sets.  Clinical course complicated with respiratory failure, 

leucocytosis, atrial fibrillation with RVR and diarrhea.  Palliative care 

consulted to assist with establishing goals of care.





Patient seen and examined in ICU.  Patient is lethargic, no eye opening, 

withdraws to noxious stimulation, right side weaker than left.  No verbal 

response. Per bedside RN, patient has expressive aphasia. Low-grade temperature 

99.1 F, heart rate 130s on Cardizem infusion 15 mg/hr, respirations 22, O2 

saturation mid 90s on BiPAP5/15/50% .Laboratory workup today revealing WBC 20.6

, sodium 153, potassium 3.2, AST 40, ALT 61. 





Telephone conversation with patient's son Analia Nelson.  Obtained 

psychosocial and past medical history. According to patient`s son, patient does 

not have advanced directives. Patient`s son is already aware of patient`s past 

medical history and has kept in touch with his mother even while she was in the 

nursing home. Per patients` son, patient was planning on moving back to [place] 

[place] to be closer to family approximately 2 years ago when she had a stroke. 

Prior to having a stroke, patient had started talking to her son regarding her 

wishes to be cremated and unfortunately he never had a chance to discuss what 

her wishes were regarding end of life care due to expressive aphasia. Notified 

patient`s son that patient made herself a DNR. Patient`s son stated that if his 

mother made herself a DNR, she would not want anything done to her to prolong 

her suffering. Patient`s son also understands that with his mothers` 

compromised immune system her recovery if any would take a long time and leave 

her in a debilitated state. Notified patient`s son that patient is very 

lethargic today and if she continues to decline family have to decide whether 

or not a nasogastric or PEG tube be inserted for nutrition and medicine 

administration. Broached hospice option and patient`s son would like to further 

discuss this with his brother and Mehdi Helms and his aunt (patient` sister

) Adelita Brown. 


Telephone conversation with other patient`s son Analia Harding. Updated him 

on patient`s medical status. Informed him that according to [place] Statute he can 

serve as his mothers health care proxy and he opted out of medical decision 

making. 


.


Function/Cognitive Trajectory


Patient is a resident at the [facility]. Per son, patient was able to ambulate 

with no assistive device and performed most of her ADLS with minimal 

assistance. He main problem was expressive aphasia. 


.





Review of Systems


ROS Limitations:  Clinical Condition


Constitutional:  COMPLAINS OF: Fever


Ears, nose, mouth, throat:  DENIES: Nasal discharge


Respiratory:  COMPLAINS OF: Cough, Shortness of breath


Cardiovascular:  COMPLAINS OF: Dyspnea on Exertion, DENIES: Lower Extremity 

Edema


Gastrointestinal:  COMPLAINS OF: Diarrhea, DENIES: Vomiting


Hematologic/Lymphatics:  COMPLAINS OF: Bruising


Neurologic:  COMPLAINS OF: Speech Problems


Other ROS:


ROS obtained from EMR and clinical observation.


.





Past Family Social History


Coded Allergies:  


     acetaminophen (Unverified  Allergy, Severe, GI UPSET, 8/15/17)


     hydrocodone (Unverified  Allergy, Severe, GI UPSET, 8/15/17)


Past Medical History


History


HIV


COPD


Atrial fibrillation


Hyperlipidemia


Mild aortic stenosis


Multiple falls


Left MCA distribution stroke with residual right hemiparesis and expressive 

aphasia


Hypertension


Asthma


GERD


Left breast cancer


.


Past Surgical History


Left mastectomy


Bilateral cataract surgery


ORIF to bilateral lower extremities


.


Reported Medications


Xanax 0.25 Mg (Alprazolam) 0.25 Mg Tab 0.25 Mg PO Q8H PRN


Prozac (Fluoxetine HCl) 20 Mg Cap 40 Mg PO DAILY


Sustiva (Efavirenz) 600 Mg Tab 600 Mg PO HS


Ecotrin Low Strength (Aspirin) 81 Mg Tabec 81 Mg PO DAILY


Epzicom 600/300 (Abacavir/Lamivudine) 600 Mg-300 Mg Tab 1 Tab PO DAILY


Cardizem 60 mg tab (Diltiazem HCl) 60 Mg Tab 1 Tab PO QID


Mag-Ox 400 Mg Tab (Magnesium Oxide) 400 Mg Tab 800 Mg PO TID


Kcl 20 Meq Tab (Potassium Chloride) 20 Meq Tabcr 20 Meq PO DAILY


Ultram (Tramadol HCl) 50 Mg Tab 50 Mg PO Q8H PRN


Nexium 24Hr (Esomeprazole Magnesium) 20 Mg Cap 20 Mg PO DAILY


Crestor (Rosuvastatin Calcium) 40 Mg Tab 40 Mg PO HS


Cordarone 200 Mg Tab (Amiodarone HCl) 200 Mg Tab 300 Mg PO Q12HR


Coreg 12.5 mg (Carvedilol) 12.5 Mg Tab 1 Tab PO BID


Losartan Potassium 50 Mg Tab 50 Mg PO BID


Hydrodiuril (Hydrochlorothiazide) 25 Mg Tab 25 Mg PO DAILY


Plavix (Clopidogrel Bisulfate) 75 Mg Tab 75 Mg PO DAILY


Neurontin (Gabapentin) 800 Mg Tab 800 Mg PO BID


Singulair (Montelukast Sodium) 10 Mg Tab 10 Mg PO HS


Ambien 10 Mg Tab (Zolpidem Tartrate) 10 Mg Tab 10 Mg PO HS


.





Current Medications








 Medications


  (Trade)  Dose


 Ordered  Sig/Rebecca


 Route  Start Time


 Stop Time Status Last Admin


 


  (NS Flush)  2 ml  UNSCH  PRN


 IV FLUSH  18 13:15


     


 


 


  (NS Flush)  2 ml  BID


 IV FLUSH  18 13:15


    3/5/18 09:08


 


 


  (Duoneb Neb)  1 ampule  Q4HR NEB  PRN


 INH  18 13:15


    3/4/18 19:25


 


 


  (Peridex 0.12%


 Liq)  15 ml  BID@08,20


 MT  18 20:00


    3/4/18 08:00


 


 


 Miscellaneous


 Information  1  Q361D


 XX  18 13:15


     


 


 


  (Chlorhexidine


 2% Cloth)  3 pack


 Taper  DAILY@04


 TOP  3/1/18 04:00


 19 03:59  3/5/18 04:00


 


 


  (Chlorhexidine


 2% Cloth)  3 pack  UNSCH  PRN


 TOP  18 13:15


     


 


 


 Linezolid  300 ml @ 


 300 mls/hr  Q12H


 IV  18 14:00


    3/5/18 00:37


 


 


  (Tamiflu)  75 mg  BID


 PO  18 21:00


    3/4/18 08:58


 


 


  (Cordarone)  300 mg  Q12HR


 PO  18 21:00


    3/4/18 08:58


 


 


  (Coreg)  12.5 mg  BID


 PO  18 21:00


    3/4/18 08:58


 


 


  (Plavix)  75 mg  DAILY


 PO  3/1/18 09:00


    3/4/18 08:58


 


 


  (Singulair)  10 mg  HS


 PO  18 21:00


    3/2/18 20:26


 


 


  (Ecotrin Ec)  81 mg  DAILY


 PO  3/1/18 09:00


    3/4/18 08:58


 


 


  (Sustiva)  600 mg  HS


 PO  18 21:00


    3/2/18 20:27


 


 


  (Lipitor)  80 mg  HS


 PO  18 21:00


    3/2/18 20:26


 


 


 Piperacillin Sod/


 Tazobactam Sod  50 ml @ 


 100 mls/hr  Q6H


 IV  18 17:00


    3/5/18 04:13


 


 


  (NovoLOG


 SUPPLEMENTAL


 SCALE)  1  Q4HR


 SQ  18 16:00


    3/5/18 09:09


 


 


  (D50w (Vial) Inj)  50 ml  UNSCH  PRN


 IV PUSH  18 14:45


     


 


 


  (Glucagon Inj)  1 mg  UNSCH  PRN


 OTHER  18 14:45


     


 


 


  (Epivir)  300 mg  DAILY


 PO  3/1/18 09:00


    3/4/18 08:58


 


 


  (Ziagen)  600 mg  DAILY


 PO  3/1/18 09:00


    3/4/18 08:59


 


 


  (Morphine Inj)  2 mg  Q3H  PRN


 IV PUSH  18 15:45


    3/4/18 19:59


 


 


  (Lovenox Inj)  40 mg  Q24H


 SQ  3/2/18 15:00


    3/4/18 15:43


 


 


  (Pepcid Inj)  20 mg  Q12H


 IV PUSH  3/2/18 15:00


    3/5/18 04:14


 


 


  (Apresoline Inj)  20 mg  Q4H  PRN


 IV  3/2/18 23:45


    3/5/18 09:06


 


 


  (Lasix Inj)  40 mg  BID@18


 IV PUSH  3/3/18 18:00


    3/5/18 09:06


 


 


 Diltiazem HCl 125


 mg/Sodium Chloride  125 ml @ 5


 mls/hr  TITRATE  PRN


 IV  3/3/18 17:45


    3/5/18 09:17


 


 


  (Cardizem Inj)  20 mg  UNSCH X1  PRN


 IV PUSH  3/3/18 18:00


 3/6/18 17:59   


 


 


  (SoluMEDROL INJ)  60 mg  Q6HR


 IV PUSH  3/4/18 12:00


    3/5/18 04:14


 


 


 Potassium Chloride  100 ml @ 


 50 mls/hr  Q2H  PRN


 IV  3/4/18 15:00


    3/4/18 15:42


 


 


 Potassium Chloride  100 ml @ 


 50 mls/hr  Q2H  PRN


 IV  3/4/18 15:00


    3/5/18 00:37


 


 


  (K-Lyte Cl  Eff)  50 meq  UNSCH  PRN


 PO  3/4/18 15:00


     


 


 


 Potassium Chloride  100 ml @ 


 25 mls/hr  UNSCH  PRN


 IV  3/4/18 15:00


     


 


 


 Potassium Chloride  100 ml @ 


 50 mls/hr  Q2H  PRN


 IV  3/4/18 15:00


     


 


 


 Magnesium Sulfate


 4 gm/Sodium


 Chloride  100 ml @ 


 50 mls/hr  UNSCH  PRN


 IV  3/4/18 15:00


     


 


 


  (Mag-Ox)  800 mg  UNSCH  PRN


 PO  3/4/18 15:00


     


 


 


 Magnesium Sulfate


 2 gm/Sodium


 Chloride  100 ml @ 


 50 mls/hr  UNSCH  PRN


 IV  3/4/18 15:00


     


 


 


  (K-Phos)  2,000 mg  Q4H  PRN


 PO  3/4/18 15:00


     


 


 


 Sodium Phosphate


 30 mmol/Sodium


 Chloride  250 ml @ 


 42 mls/hr  UNSCH  PRN


 IV  3/4/18 15:00


     


 


 


  (K-Phos)  2,000 mg  UNSCH  PRN


 PO/TUBE  3/4/18 15:00


     


 


 


 Potassium


 Phosphate 30 mmol/


 Sodium Chloride  260 ml @ 


 42 mls/hr  UNSCH  PRN


 IV  3/4/18 15:00


    3/5/18 06:31


 





.


Family History


Mother -had colon cancer


Father -had lung cancer


.


Substance Use


Tobacco: Current smoker -1 PPD for 30 years


Alcohol: None reported


Prescription med abuse: None reported


Illicits: None reported


.


Psychosocial History


Patient was born in [place] and was raised in [place].  Patient was  

and  twice.  Patient his 2 adult sons.  Per son patient he had multiple 

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


.


Spiritual/Cultural Factors


Patient is a Religious and per son he would like a  to visit with her.


.


Living Will:  Never completed


Health Care Surrogate:  Never completed


Durable Power of :  Never completed


Health Care Surrogate(s):


Health Care Proxy -Son-Omar HelmsDpsbestfb-[phone]


Son-Analia Harding [phone]


According to the [place] Statute, her 2 adult sons, Omar Helms and Mehdi Helms would serve as her health care proxys. Patient`s son Analia Harding 

opted out of medical decision making for his mother. 


.


Ethical and Legal Issues


None identified at this time


.





Physical Exam





Vital Signs








  Date Time  Temp Pulse Resp B/P (MAP) Pulse Ox O2 Delivery O2 Flow Rate FiO2


 


3/5/18 09:17  139  176/99    


 


3/5/18 08:00 99.1 130 41 156/94 (114) 97   


 


3/5/18 07:36     98   50


 


3/5/18 07:36     98 BiPAP  50


 


3/5/18 07:00     98 Bi-Pap  50


 


3/5/18 06:00  126      


 


3/5/18 04:15     96   50


 


3/5/18 04:00 98.3 127 30 150/97 (114) 96   


 


3/5/18 04:00  127      


 


3/5/18 02:00  128      


 


3/5/18 02:00  128      


 


3/5/18 00:50     97   50


 


3/5/18 00:00 98.0 124 24 114/70 (85) 96   


 


3/4/18 23:13  130  105/74    


 


3/4/18 22:06     94 Bi-Pap  50


 


3/4/18 22:00  140      


 


3/4/18 21:54     95   50


 


3/4/18 20:08     96 Non-Rebreather 15.00 


 


3/4/18 20:00  144      


 


3/4/18 20:00 100.3 144 30 136/76 (96) 96   


 


3/4/18 19:23     96   50


 


3/4/18 19:00     93 Non-Rebreather 15.00 


 


3/4/18 19:00  136  171/102    


 


3/4/18 18:00  115      


 


3/4/18 16:00 100.0 119 20 159/92 (114) 100   


 


3/4/18 16:00  115      


 


3/4/18 15:58  117  159/82    


 


3/4/18 14:00  115      


 


3/4/18 12:00 99.8 114 22 150/98 (115) 98   


 


3/4/18 12:00  115      


 


3/4/18 10:00  109      








Exam


CONSTITUTIONAL/GENERAL: This is an adequately nourished patient, in mild 

respiratory distress.


TUBES/LINES/DRAINS: PIV, Warner catheter


SKIN: No jaundice, rashes, or lesions.  Healed mastectomy scar to the left 

breast. ecchymoses on upper and lower extremities. No wounds seen anteriorly. 

Skin temperature appropriate. Not diaphoretic. 


HEAD: Atraumatic. Normocephalic.


EYES: Pupils equal and round and reactive. Extraocular motions intact. No 

scleral icterus. No injection or drainage. Fundi not examined.


ENT: Unable to assess.  Nose without bleeding or purulent drainage. 


NECK: Trachea midline. Supple, nontender. 


CARDIOVASCULAR: Irregular rhythm without murmurs, gallops, or rubs. HR 130S. No 

JVD. Peripheral pulses symmetric.


RESPIRATORY/CHEST: Symmetric,labored respirations.  Rhonchi to auscultation.  

Diminished breath sounds in the lower lobes. No wheezes  


GASTROINTESTINAL: Abdomen soft, non-tender, nondistended. No hepato-splenomegaly

, or palpable masses. No guarding. Bowel sounds present.


GENITOURINARY: Without palpable bladder distension. Warner catheter in place.


MUSCULOSKELETAL: Extremities without clubbing or edema. No joint tenderness or 

effusion noted. No calf tenderness. 


NEUROLOGICAL: Lethargic, minimally responsive to noxious stimulation. Withdraws 

with all extremities.


PSYCHIATRIC: Unable to assess due to clinical condition


.





Diagnostic Tests


Laboratory





Laboratory Tests








Test


  3/3/18


17:33 3/3/18


17:40 3/3/18


19:19 3/3/18


19:30


 


White Blood Count


  19.4 TH/MM3


(4.0-11.0) 


  


  


 


 


Red Blood Count


  4.71 MIL/MM3


(4.00-5.30) 


  


  


 


 


Hemoglobin


  14.9 GM/DL


(11.6-15.3) 


  


  


 


 


Hematocrit


  43.2 %


(35.0-46.0) 


  


  


 


 


Mean Corpuscular Volume


  91.7 FL


(80.0-100.0) 


  


  


 


 


Mean Corpuscular Hemoglobin


  31.7 PG


(27.0-34.0) 


  


  


 


 


Mean Corpuscular Hemoglobin


Concent 34.5 %


(32.0-36.0) 


  


  


 


 


Red Cell Distribution Width


  17.5 %


(11.6-17.2) 


  


  


 


 


Platelet Count


  250 TH/MM3


(150-450) 


  


  


 


 


Mean Platelet Volume


  9.9 FL


(7.0-11.0) 


  


  


 


 


Neutrophils (%) (Auto)


  90.1 %


(16.0-70.0) 


  


  


 


 


Lymphocytes (%) (Auto)


  5.3 %


(9.0-44.0) 


  


  


 


 


Monocytes (%) (Auto)


  4.5 %


(0.0-8.0) 


  


  


 


 


Eosinophils (%) (Auto)


  0.0 %


(0.0-4.0) 


  


  


 


 


Basophils (%) (Auto)


  0.1 %


(0.0-2.0) 


  


  


 


 


Neutrophils # (Auto)


  17.5 TH/MM3


(1.8-7.7) 


  


  


 


 


Lymphocytes # (Auto)


  1.0 TH/MM3


(1.0-4.8) 


  


  


 


 


Monocytes # (Auto)


  0.9 TH/MM3


(0-0.9) 


  


  


 


 


Eosinophils # (Auto)


  0.0 TH/MM3


(0-0.4) 


  


  


 


 


Basophils # (Auto)


  0.0 TH/MM3


(0-0.2) 


  


  


 


 


CBC Comment AUTO DIFF    


 


Differential Total Cells


Counted 100 


  


  


  


 


 


Neutrophils % (Manual) 86 % (16-70)    


 


Band Neutrophils % 3 % (0-6)    


 


Lymphocytes % 2 % (9-44)    


 


Monocytes % 4 % (0-8)    


 


Neutrophils # (Manual)


  18.2 TH/MM3


(1.8-7.7) 


  


  


 


 


Myelocytes 5 % (0-0)    


 


Differential Comment


  FINAL DIFF


MANUAL 


  


  


 


 


Platelet Estimate


  NORMAL


(NORMAL) 


  


  


 


 


Platelet Morphology Comment


  NORMAL


(NORMAL) 


  


  


 


 


Red Cell Morphology Comment


  NORMAL


(NORMAL) 


  


  


 


 


Blood Urea Nitrogen


  34 MG/DL


(7-18) 


  


  


 


 


Creatinine


  0.89 MG/DL


(0.50-1.00) 


  


  


 


 


Random Glucose


  229 MG/DL


() 


  


  


 


 


Calcium Level


  8.3 MG/DL


(8.5-10.1) 


  


  


 


 


Phosphorus Level


  2.1 MG/DL


(2.5-4.9) 


  


  


 


 


Magnesium Level


  2.0 MG/DL


(1.5-2.5) 


  


  


 


 


Sodium Level


  145 MEQ/L


(136-145) 


  


  


 


 


Potassium Level


  3.5 MEQ/L


(3.5-5.1) 


  


  


 


 


Chloride Level


  117 MEQ/L


() 


  


  


 


 


Carbon Dioxide Level


  21.0 MEQ/L


(21.0-32.0) 


  


  


 


 


Anion Gap 7 MEQ/L (5-15)    


 


Estimat Glomerular Filtration


Rate 64 ML/MIN


(>89) 


  


  


 


 


B-Type Natriuretic Peptide


  486 PG/ML


(0-100) 


  


  


 


 


Blood Gas Puncture Site  RT BRACHIAL   


 


Blood Gas Patient Temperature  98.6   


 


Blood Gas HCO3


  


  18 mmol/L


(22-26) 


  


 


 


Blood Gas Base Excess


  


  -7.9 mmol/L


(-2-2) 


  


 


 


Blood Gas Oxygen Saturation  95 % ()   


 


Arterial Blood pH


  


  7.28


(7.380-7.420) 


  


 


 


Arterial Blood Partial


Pressure CO2 


  38 mmHg


(38-42) 


  


 


 


Arterial Blood Partial


Pressure O2 


  89 mmHG


() 


  


 


 


Arterial Blood Oxygen Content


  


  20.5 Vol %


(12.0-20.0) 


  


 


 


Arterial Blood


Carboxyhemoglobin 


  0.8 % (0-4) 


  


  


 


 


Arterial Blood Methemoglobin  0.7 % (0-2)   


 


Blood Gas Hemoglobin


  


  15.2 G/DL


(12.0-16.0) 


  


 


 


Oxygen Delivery Device


  


  BIPAP


12IPAP/5EPAP 


  


 


 


Blood Gas Inspired Oxygen  50 %   


 


Total Creatine Kinase


  


  


  87 U/L


() 


 


 


Troponin I


  


  


  0.02 NG/ML


(0.02-0.05) 


 


 


Free Thyroxine


  


  


  1.20 NG/DL


(0.76-1.46) 


 


 


Thyroid Stimulating Hormone


3rd Gen 


  


  0.119 uIU/ML


(0.358-3.740) 


 


 


Urine Color


  


  


  


  YELLOW


(YELLW/STRAW)


 


Urine Turbidity    CLEAR (CLEAR) 


 


Urine pH    6.0 (5.0-8.5) 


 


Urine Specific Gravity


  


  


  


  1.010


(1.002-1.035)


 


Urine Protein


  


  


  


  NEG mg/dL


(NEG-TRACE)


 


Urine Glucose (UA)


  


  


  


  NEG mg/dL


(NEG)


 


Urine Ketones


  


  


  


  NEG mg/dL


(NEG)


 


Urine Occult Blood    NEG (NEG) 


 


Urine Nitrite    NEG (NEG) 


 


Urine Bilirubin    NEG (NEG) 


 


Urine Urobilinogen


  


  


  


  LESS THAN 2.0


MG/DL (LESS


 


Urine Leukocyte Esterase    NEG (NEG) 


 


Urine RBC    2 /hpf (0-3) 


 


Urine WBC    3 /hpf (0-5) 


 


Urine Squamous Epithelial


Cells 


  


  


  2 /hpf (0-5) 


 


 


Urine Hyaline Casts    3 /lpf (RARE) 


 


Urine Yeast (Budding)    MOD (NONE) 


 


Microscopic Urinalysis Comment


  


  


  


  CATH-CULT NOT


IND


 


Test


  3/3/18


20:03 3/4/18


01:12 3/4/18


05:29 3/4/18


10:10


 


Blood Gas Puncture Site RT RADIAL   RT RADIAL  


 


Blood Gas Patient Temperature 98.6   98.6  


 


Blood Gas HCO3


  20 mmol/L


(22-26) 


  19 mmol/L


(22-26) 


 


 


Blood Gas Base Excess


  -6.4 mmol/L


(-2-2) 


  -5.4 mmol/L


(-2-2) 


 


 


Blood Gas Oxygen Saturation 92 % ()   95 % ()  


 


Arterial Blood pH


  7.26


(7.380-7.420) 


  7.40


(7.380-7.420) 


 


 


Arterial Blood Partial


Pressure CO2 46 mmHg


(38-42) 


  30 mmHg


(38-42) 


 


 


Arterial Blood Partial


Pressure O2 75 mmHg


() 


  93 mmHg


() 


 


 


Arterial Blood Oxygen Content


  20.0 Vol %


(12.0-20.0) 


  19.8 Vol %


(12.0-20.0) 


 


 


Arterial Blood


Carboxyhemoglobin 0.7 % (0-4) 


  


  0.9 % (0-4) 


  


 


 


Arterial Blood Methemoglobin 1.2 % (0-2)   1.2 % (0-2)  


 


Blood Gas Hemoglobin


  15.5 G/DL


(12.0-16.0) 


  14.8 G/DL


(12.0-16.0) 


 


 


Oxygen Delivery Device BIPAP   BIPAP  


 


Blood Gas Ventilator Setting 12 IPAP/5 EPAP   15IPAP/5EPAP  


 


Blood Gas Inspired Oxygen 50 %   50 %  


 


Total Creatine Kinase


  


  50 U/L


() 


  79 U/L


()


 


Troponin I


  


  0.02 NG/ML


(0.02-0.05) 


  0.02 NG/ML


(0.02-0.05)


 


Test


  3/4/18


13:16 3/5/18


04:25 


  


 


 


White Blood Count


  16.5 TH/MM3


(4.0-11.0) 20.6 TH/MM3


(4.0-11.0) 


  


 


 


Red Blood Count


  4.85 MIL/MM3


(4.00-5.30) 4.77 MIL/MM3


(4.00-5.30) 


  


 


 


Hemoglobin


  15.4 GM/DL


(11.6-15.3) 15.1 GM/DL


(11.6-15.3) 


  


 


 


Hematocrit


  44.6 %


(35.0-46.0) 43.5 %


(35.0-46.0) 


  


 


 


Mean Corpuscular Volume


  91.8 FL


(80.0-100.0) 91.3 FL


(80.0-100.0) 


  


 


 


Mean Corpuscular Hemoglobin


  31.8 PG


(27.0-34.0) 31.6 PG


(27.0-34.0) 


  


 


 


Mean Corpuscular Hemoglobin


Concent 34.7 %


(32.0-36.0) 34.6 %


(32.0-36.0) 


  


 


 


Red Cell Distribution Width


  17.8 %


(11.6-17.2) 17.5 %


(11.6-17.2) 


  


 


 


Platelet Count


  246 TH/MM3


(150-450) 241 TH/MM3


(150-450) 


  


 


 


Mean Platelet Volume


  9.7 FL


(7.0-11.0) 9.8 FL


(7.0-11.0) 


  


 


 


Neutrophils (%) (Auto)


  86.3 %


(16.0-70.0) 90.8 %


(16.0-70.0) 


  


 


 


Lymphocytes (%) (Auto)


  6.1 %


(9.0-44.0) 3.7 %


(9.0-44.0) 


  


 


 


Monocytes (%) (Auto)


  7.5 %


(0.0-8.0) 5.3 %


(0.0-8.0) 


  


 


 


Eosinophils (%) (Auto)


  0.0 %


(0.0-4.0) 0.0 %


(0.0-4.0) 


  


 


 


Basophils (%) (Auto)


  0.1 %


(0.0-2.0) 0.2 %


(0.0-2.0) 


  


 


 


Neutrophils # (Auto)


  14.2 TH/MM3


(1.8-7.7) 18.7 TH/MM3


(1.8-7.7) 


  


 


 


Lymphocytes # (Auto)


  1.0 TH/MM3


(1.0-4.8) 0.8 TH/MM3


(1.0-4.8) 


  


 


 


Monocytes # (Auto)


  1.2 TH/MM3


(0-0.9) 1.1 TH/MM3


(0-0.9) 


  


 


 


Eosinophils # (Auto)


  0.0 TH/MM3


(0-0.4) 0.0 TH/MM3


(0-0.4) 


  


 


 


Basophils # (Auto)


  0.0 TH/MM3


(0-0.2) 0.0 TH/MM3


(0-0.2) 


  


 


 


CBC Comment AUTO DIFF  AUTO DIFF   


 


Differential Comment


  AUTO DIFF


CONFIRMED FINAL DIFF


MANUAL 


  


 


 


Platelet Estimate


  NORMAL


(NORMAL) NORMAL


(NORMAL) 


  


 


 


Platelet Morphology Comment


  ENLARGED


(NORMAL) NORMAL


(NORMAL) 


  


 


 


Blood Urea Nitrogen


  41 MG/DL


(7-18) 44 MG/DL


(7-18) 


  


 


 


Creatinine


  0.97 MG/DL


(0.50-1.00) 1.00 MG/DL


(0.50-1.00) 


  


 


 


Random Glucose


  208 MG/DL


() 184 MG/DL


() 


  


 


 


Total Protein


  7.3 GM/DL


(6.4-8.2) 7.1 GM/DL


(6.4-8.2) 


  


 


 


Albumin


  2.6 GM/DL


(3.4-5.0) 2.6 GM/DL


(3.4-5.0) 


  


 


 


Calcium Level


  8.8 MG/DL


(8.5-10.1) 8.7 MG/DL


(8.5-10.1) 


  


 


 


Phosphorus Level


  1.7 MG/DL


(2.5-4.9) 1.7 MG/DL


(2.5-4.9) 


  


 


 


Magnesium Level


  2.0 MG/DL


(1.5-2.5) 1.9 MG/DL


(1.5-2.5) 


  


 


 


Alkaline Phosphatase


  108 U/L


() 107 U/L


() 


  


 


 


Aspartate Amino Transf


(AST/SGOT) 30 U/L (15-37) 


  40 U/L (15-37) 


  


  


 


 


Alanine Aminotransferase


(ALT/SGPT) 69 U/L (10-53) 


  61 U/L (10-53) 


  


  


 


 


Total Bilirubin


  0.4 MG/DL


(0.2-1.0) 0.6 MG/DL


(0.2-1.0) 


  


 


 


Sodium Level


  149 MEQ/L


(136-145) 153 MEQ/L


(136-145) 


  


 


 


Potassium Level


  2.2 MEQ/L


(3.5-5.1) 3.2 MEQ/L


(3.5-5.1) 


  


 


 


Chloride Level


  116 MEQ/L


() 120 MEQ/L


() 


  


 


 


Carbon Dioxide Level


  21.6 MEQ/L


(21.0-32.0) 19.7 MEQ/L


(21.0-32.0) 


  


 


 


Anion Gap


  11 MEQ/L


(5-15) 13 MEQ/L


(5-15) 


  


 


 


Estimat Glomerular Filtration


Rate 58 ML/MIN


(>89) 56 ML/MIN


(>89) 


  


 


 


Differential Total Cells


Counted 


  100 


  


  


 


 


Neutrophils % (Manual)  86 % (16-70)   


 


Lymphocytes %  6 % (9-44)   


 


Monocytes %  6 % (0-8)   


 


Neutrophils # (Manual)


  


  18.1 TH/MM3


(1.8-7.7) 


  


 


 


Myelocytes  2 % (0-0)   


 


Nucleated Red Blood Cells


  


  1 /100 WBC


(0-0) 


  


 


 


Toxic Granulation  1+ (NORMAL)   








Result Diagram:  


3/5/18 0425                                                                    

            3/5/18 0425





Microbiology





Microbiology








 Date/Time


Source Procedure


Growth Status


 


 


 3/3/18 17:08


Urine Random Urine Legionella Antigen - Final


PRESUMPTIVE NEGATIVE FOR LEGIONELLA P... Complete


 


 3/3/18 17:08


Urine Random Urine Streptococcus pneumoniae Antigen (M - Final


PRESUMPTIVE NEGATIVE FOR STREPTOCOCCU... Complete








Imaging





Last Impressions








Chest X-Ray 3/4/18 0000 Signed





Impressions: 





 Service Date/Time:  2018 09:04 - CONCLUSION: The improvement 





 with less interstitial edema.      Lito Salomon MD  FACR


 


Liver Ultrasound 18 0000 Signed





Impressions: 





 Service Date/Time:  2018 13:48 - CONCLUSION:  1. Mild 





 increased echotexture of the liver may indicate steatosis. 2. Cholelithiasis. 

   





  Yg Rodarte MD 


 


Chest CT 18 0000 Signed





Impressions: 





 Service Date/Time:  2018 13:27 - CONCLUSION:  Patchy 





 airspace disease scattered consolidation both lungs.  No pleural effusion.    

  





 Lito Salomon MD  FACR











Patient/Family Conference


Family Conference Location:  Telephone


Issues Discussed:


* Palliative care role, purpose, approach


* Additional medical, psychosocial, and spiritual history


* Patients general health, functional status, and cognitive changes in the 

months leading up to the current hospitalization


* Patient/family understanding of the current medical problems


* Patient/family understanding of prognosis


* Patients goals of care as best understood from advance directives and/or 

conversations and/or values


* Current medical treatment options and benefits/burdens of those options


* Likely scenarios comparing ongoing aggressive care with a transition to 

comfort measures only


* Questions answered to the best of my ability


* Introduced hospice philosophy and benefits


* Palliative care contact information provided





Assessment and Plan


Disease Oriented Problem List:  


(1) Bilateral multilobar pneumonia


(2) Acute kidney injury


(3) History of HIV infection


(4) Elevated liver enzymes


(5) Atrial fibrillation with RVR


(6) History of diabetes mellitus


(7) History of ischemic left MCA stroke


Symptom Scale:  


(1) Shortness of breath


0-10 Scale:  Unable to quantify


Comment:  Multifactorial.  Patient has cardiac history and COPD.  Patient also 

had influenza.  Came in with in respiratory distress chest x-ray revealed 

bilateral pneumonia.


.





(2) Debility


0-10 Scale:  Unable to quantify


Comment:  Progressive.


.





Pertinent Non-Medical Issues


Psychosocial:Patient was born in [place] and was raised in [place].  

Patient was  and  twice.  Patient his 2 adult sons.  Per son 

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


Spiritual: Patient is Religious-Open to  visits per son Omar


Legal:Patient has a signed Community DNR


Ethical issues impacting care:None identified at this time


.


Important Contacts


Son- Omar Helms [phone]


Son- Mehdi Helms [phone]


Sister- J Luis UreñaDrju-[phone]


Granddaughter-Tania [phone]


Friend-Sgbryson Duran- [phone]


.


Prognosis


Mrs Skaggs is a 63 years old female with a past medical history of asthma, 

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 

chronic atrial fibrillation, carotid stenosis, hypertension, hepatitis C, HIV 

on HAART and GERD. Patient was brought to the ER on 18 from a nursing home 

after she was noted to be in worsening respiratory distress and low grade 

fever. Patient was being treated with Tamiflu for influenza and was on day 5 

when she came to the ER.  Clinical course complicated with respiratory failure, 

leucocytosis, atrial fibrillation with RVR and diarrhea. Given ongoing 

comorbidities, patient remains at high risk for further complications, 

deterioration and decline.


.


Code Status:  No Code


Plan


PLAN:


   Legal decision maker: Patient is currently lethargic, no verbalizing and is 

not able to participate in medical decision making. According to the [place] Statute

, patient`s son, Omar Helms will serve as her health care proxy. 





   Goals: Aggressive short of no code





   CODE STATUS: No Code DNR





SYMPTOMS:


* Shortness of breath: Multifactorial.  Patient has cardiac history and COPD.  

Patient also had influenza.  Came in with in respiratory distress. Chest x-ray 

revealed bilateral pneumonia.  Patient is on Tamiflu and antibiotics.  She is 

currently on BiPAP5/15/50% saturating in the low to mid 90s.  Patient is also 

on Solu-Medrol Q 6HRS, morphine sulfate 2 mg Q 3HRS, duo nebsQ 4 HRS PRN.  No 

recommendations at this time.


* Debility: Progressive.  Patient has a history of CVA with residual right-

sided hemiparesis and expressive aphasia. Per son, patient was ambulating with 

no assistive device at the nursing home. Patient may benefit from Physical 

therapy, speech and occupational therapy if goals remain aggressive though at 

this time patient will most likely be unable to effectively participate due to 

her respiratory and cardiac status.


.


   


   





Palliative care will continue to follow the patient during hospital course as 

condition evolves, to assist patient/decision-maker with understanding of their 

medical conditions, weighing benefits/burdens of treatment options, for 

clarification of goals of treatment.  Additionally will assist with any 

symptoms of palliative concern





Thank you for the opportunity to participate in the care of Ms. Skaggs.





Attestation


To help prompt me to consider important information that might be impacting 

today's encounter and assessment, information from prior notes written by 

myself or my colleagues may have been "brought forward" into today's note.  My 

signature on this note, however, is an attestation that I personally performed 

the exam, history, and/or decision-making noted today, and, unless otherwise 

indicated, the interactions with patient, family, and staff as well as the 

review of records all occurred today.  I also attest that the listed assessment 

and stated plan reflect my best clinical judgment today based on the 

combination of historical information, prior notes, and today's exam/ 

interactions.  When time spent is documented, it refers only to time spent 

today by the signer, or if indicated, combined time spent today by 

collaborating physician/nurse practitioner.











Nando Curry Mar 5, 2018 10:39

## 2018-03-05 NOTE — HHI.IDPN
Subjective


Subjective


Remarks


lethargic 


On BIPAP


resolved fever


no diarrhea


Antibiotics


Abacavir, Epivir, Sustiva, NOrvir





zyvox


zosyn


tamiflu


Allergies:  


Coded Allergies:  


     acetaminophen (Unverified  Allergy, Severe, GI UPSET, 8/15/17)


     hydrocodone (Unverified  Allergy, Severe, GI UPSET, 8/15/17)





Objective


.





Vital Signs








  Date Time  Temp Pulse Resp B/P (MAP) Pulse Ox O2 Delivery O2 Flow Rate FiO2


 


3/5/18 17:49  126  151/82    


 


3/5/18 16:00 98.8 130 20 152/85 (107) 98   


 


3/5/18 16:00     98   50


 


3/5/18 16:00  130      


 


3/5/18 14:00  129      


 


3/5/18 12:00 99.3 130 23 133/62 (85) 97   


 


3/5/18 12:00  132      


 


3/5/18 11:41     96   50


 


3/5/18 11:28   22     


 


3/5/18 10:00  135      


 


3/5/18 09:17  139  176/99    


 


3/5/18 08:00  130      


 


3/5/18 08:00 99.1 130 41 156/94 (114) 97   


 


3/5/18 07:36     98   50


 


3/5/18 07:36     98 BiPAP  50


 


3/5/18 07:00     98 Bi-Pap  50


 


3/5/18 06:00  126      


 


3/5/18 04:15     96   50


 


3/5/18 04:00 98.3 127 30 150/97 (114) 96   


 


3/5/18 04:00  127      


 


3/5/18 02:00  128      


 


3/5/18 02:00  128      


 


3/5/18 00:50     97   50


 


3/5/18 00:00 98.0 124 24 114/70 (85) 96   


 


3/4/18 23:13  130  105/74    


 


3/4/18 22:06     94 Bi-Pap  50


 


3/4/18 22:00  140      


 


3/4/18 21:54     95   50


 


3/4/18 20:08     96 Non-Rebreather 15.00 


 


3/4/18 20:00  144      


 


3/4/18 20:00 100.3 144 30 136/76 (96) 96   


 


3/4/18 19:23     96   50














 3/5/18 3/5/18 3/6/18





 15:00 23:00 07:00


 


Intake Total  0 ml 


 


Output Total  1300 ml 


 


Balance  -1300 ml 


 


   


 


Intake Oral  0 ml 


 


Output Urine Total  1300 ml 


 


# Bowel Movements  0 








.





Laboratory Tests








Test


  3/4/18


13:16 3/5/18


04:25


 


White Blood Count 16.5 TH/MM3  20.6 TH/MM3 


 


Red Blood Count 4.85 MIL/MM3  4.77 MIL/MM3 


 


Hemoglobin 15.4 GM/DL  15.1 GM/DL 


 


Hematocrit 44.6 %  43.5 % 


 


Mean Corpuscular Volume 91.8 FL  91.3 FL 


 


Mean Corpuscular Hemoglobin 31.8 PG  31.6 PG 


 


Mean Corpuscular Hemoglobin


Concent 34.7 % 


  34.6 % 


 


 


Red Cell Distribution Width 17.8 %  17.5 % 


 


Platelet Count 246 TH/MM3  241 TH/MM3 


 


Mean Platelet Volume 9.7 FL  9.8 FL 


 


Neutrophils (%) (Auto) 86.3 %  90.8 % 


 


Lymphocytes (%) (Auto) 6.1 %  3.7 % 


 


Monocytes (%) (Auto) 7.5 %  5.3 % 


 


Eosinophils (%) (Auto) 0.0 %  0.0 % 


 


Basophils (%) (Auto) 0.1 %  0.2 % 


 


Neutrophils # (Auto) 14.2 TH/MM3  18.7 TH/MM3 


 


Lymphocytes # (Auto) 1.0 TH/MM3  0.8 TH/MM3 


 


Monocytes # (Auto) 1.2 TH/MM3  1.1 TH/MM3 


 


Eosinophils # (Auto) 0.0 TH/MM3  0.0 TH/MM3 


 


Basophils # (Auto) 0.0 TH/MM3  0.0 TH/MM3 


 


CBC Comment AUTO DIFF  AUTO DIFF 


 


Differential Comment


  AUTO DIFF


CONFIRMED FINAL DIFF


MANUAL


 


Platelet Estimate NORMAL  NORMAL 


 


Platelet Morphology Comment ENLARGED  NORMAL 


 


Differential Total Cells


Counted 


  100 


 


 


Neutrophils % (Manual)  86 % 


 


Lymphocytes %  6 % 


 


Monocytes %  6 % 


 


Neutrophils # (Manual)  18.1 TH/MM3 


 


Myelocytes  2 % 


 


Nucleated Red Blood Cells  1 /100 WBC 


 


Toxic Granulation  1+ 








Laboratory Tests








Test


  3/3/18


19:19 3/4/18


01:12 3/4/18


10:10 3/4/18


13:16


 


Total Creatine Kinase 87 U/L  50 U/L  79 U/L  


 


Troponin I 0.02 NG/ML  0.02 NG/ML  0.02 NG/ML  


 


Free Thyroxine 1.20 NG/DL    


 


Thyroid Stimulating Hormone


3rd Gen 0.119 uIU/ML 


  


  


  


 


 


Blood Urea Nitrogen    41 MG/DL 


 


Creatinine    0.97 MG/DL 


 


Random Glucose    208 MG/DL 


 


Total Protein    7.3 GM/DL 


 


Albumin    2.6 GM/DL 


 


Calcium Level    8.8 MG/DL 


 


Phosphorus Level    1.7 MG/DL 


 


Magnesium Level    2.0 MG/DL 


 


Alkaline Phosphatase    108 U/L 


 


Aspartate Amino Transf


(AST/SGOT) 


  


  


  30 U/L 


 


 


Alanine Aminotransferase


(ALT/SGPT) 


  


  


  69 U/L 


 


 


Total Bilirubin    0.4 MG/DL 


 


Sodium Level    149 MEQ/L 


 


Potassium Level    2.2 MEQ/L 


 


Chloride Level    116 MEQ/L 


 


Carbon Dioxide Level    21.6 MEQ/L 


 


Anion Gap    11 MEQ/L 


 


Estimat Glomerular Filtration


Rate 


  


  


  58 ML/MIN 


 


 


Test


  3/5/18


04:25 


  


  


 


 


Blood Urea Nitrogen 44 MG/DL    


 


Creatinine 1.00 MG/DL    


 


Random Glucose 184 MG/DL    


 


Total Protein 7.1 GM/DL    


 


Albumin 2.6 GM/DL    


 


Calcium Level 8.7 MG/DL    


 


Phosphorus Level 1.7 MG/DL    


 


Magnesium Level 1.9 MG/DL    


 


Alkaline Phosphatase 107 U/L    


 


Aspartate Amino Transf


(AST/SGOT) 40 U/L 


  


  


  


 


 


Alanine Aminotransferase


(ALT/SGPT) 61 U/L 


  


  


  


 


 


Total Bilirubin 0.6 MG/DL    


 


Sodium Level 153 MEQ/L    


 


Potassium Level 3.2 MEQ/L    


 


Chloride Level 120 MEQ/L    


 


Carbon Dioxide Level 19.7 MEQ/L    


 


Anion Gap 13 MEQ/L    


 


Estimat Glomerular Filtration


Rate 56 ML/MIN 


  


  


  


 








Microbiology








 Date/Time


Source Procedure


Growth Status


 


 


 3/3/18 17:08


Urine Random Urine Legionella Antigen - Final


PRESUMPTIVE NEGATIVE FOR LEGIONELLA P... Complete


 


 3/3/18 17:08


Urine Random Urine Streptococcus pneumoniae Antigen (M - Final


PRESUMPTIVE NEGATIVE FOR STREPTOCOCCU... Complete








Imaging


  





Last Impressions








Chest X-Ray 3/4/18 0000 Signed





Impressions: 





 Service Date/Time:  Sunday, March 4, 2018 09:04 - CONCLUSION: The improvement 





 with less interstitial edema.      Lito Salomon MD  FACR


 


Liver Ultrasound 2/28/18 0000 Signed





Impressions: 





 Service Date/Time:  Wednesday, February 28, 2018 13:48 - CONCLUSION:  1. Mild 





 increased echotexture of the liver may indicate steatosis. 2. Cholelithiasis. 

   





  Yg Rodarte MD 


 


Chest CT 2/28/18 0000 Signed





Impressions: 





 Service Date/Time:  Wednesday, February 28, 2018 13:27 - CONCLUSION:  Patchy 





 airspace disease scattered consolidation both lungs.  No pleural effusion.    

  





 Lito Salomon MD  FACR








Physical Exam


CONSTITUTIONAL/GENERAL: This is a morbidly obese patient, in mild  resp 

distress.


 SKIN: No jaundice, rashes, or lesions.   Skin temperature appropriate. Not 

diaphoretic. 


BREASTS: mastectomy scar L breast - well healed


HEAD: Atraumatic. Normocephalic.


EYES: Pupils equal and round and reactive. Extraocular motions intact. No 

scleral icterus. No injection or drainage. Fundi not examined.


ENT: Hearing grossly normal. Nose without bleeding or purulent drainage. 

Limited mucosae exam without visible erythema, or lesions.





NECK: Trachea midline. Supple, nontender.  


CARDIOVASCULAR: Regular rate and rhythm without murmurs, gallops, or rubs. No 

JVD. Peripheral pulses symmetric. Perifery well perfused 


RESPIRATORY/CHEST: Symmetric, unlabored respirations. cont to have diffuse b/l  

rhonchi to auscultation. Breath sounds equal bilaterally.   


GASTROINTESTINAL: Abdomen soft, non-tender, distended. No hepato-splenomegaly, 

or palpable masses. No guarding. Bowel sounds present.


GENITOURINARY: Without palpable bladder distension. 


MUSCULOSKELETAL: Extremities without clubbing, cyanosis, or edema.  


NEUROLOGICAL: Very lethargic; not much interactive 


Motor and sensory grossly within normal limits. Follows commands.  Moves all 

extremities.





Assessment & Plan


Remarks


Assessment and Plan


Influenza


 PNA


Impending resp failure: requires non ivasive vent'n 


HIV, unknown immunosuppression status


Morbid obesety


COPD


Chronic tobaccoism


Abx associated  diarrhea - new issue 


   - appears to resolve


Gram positive bactremia 2/2 different organisms - cw contamination


Worsenig leukocytosis








   cont current abx


   cont tamiflu


   cont  HAART  


   fu sputum clx


   fu blood clx


   chk stool for C.diff if again diarrhea 


 fu CD4


cytology for PCP











Maria Teresa Hammer MD Mar 5, 2018 19:14

## 2018-03-06 VITALS
OXYGEN SATURATION: 93 % | DIASTOLIC BLOOD PRESSURE: 85 MMHG | HEART RATE: 128 BPM | RESPIRATION RATE: 32 BRPM | TEMPERATURE: 97.8 F | SYSTOLIC BLOOD PRESSURE: 142 MMHG

## 2018-03-06 VITALS
RESPIRATION RATE: 24 BRPM | OXYGEN SATURATION: 94 % | SYSTOLIC BLOOD PRESSURE: 148 MMHG | HEART RATE: 106 BPM | DIASTOLIC BLOOD PRESSURE: 82 MMHG | TEMPERATURE: 98.9 F

## 2018-03-06 VITALS
SYSTOLIC BLOOD PRESSURE: 165 MMHG | RESPIRATION RATE: 18 BRPM | OXYGEN SATURATION: 97 % | HEART RATE: 127 BPM | TEMPERATURE: 98.8 F | DIASTOLIC BLOOD PRESSURE: 83 MMHG

## 2018-03-06 VITALS
HEART RATE: 120 BPM | TEMPERATURE: 97.3 F | SYSTOLIC BLOOD PRESSURE: 142 MMHG | DIASTOLIC BLOOD PRESSURE: 94 MMHG | OXYGEN SATURATION: 93 % | RESPIRATION RATE: 40 BRPM

## 2018-03-06 VITALS — OXYGEN SATURATION: 94 %

## 2018-03-06 VITALS — HEART RATE: 130 BPM

## 2018-03-06 VITALS
HEART RATE: 116 BPM | SYSTOLIC BLOOD PRESSURE: 147 MMHG | DIASTOLIC BLOOD PRESSURE: 88 MMHG | TEMPERATURE: 97.9 F | OXYGEN SATURATION: 97 % | RESPIRATION RATE: 20 BRPM

## 2018-03-06 VITALS — HEART RATE: 95 BPM

## 2018-03-06 VITALS
SYSTOLIC BLOOD PRESSURE: 146 MMHG | TEMPERATURE: 99 F | OXYGEN SATURATION: 98 % | HEART RATE: 119 BPM | DIASTOLIC BLOOD PRESSURE: 73 MMHG | RESPIRATION RATE: 19 BRPM

## 2018-03-06 VITALS — OXYGEN SATURATION: 99 %

## 2018-03-06 VITALS — OXYGEN SATURATION: 96 %

## 2018-03-06 VITALS — HEART RATE: 125 BPM

## 2018-03-06 VITALS — OXYGEN SATURATION: 98 %

## 2018-03-06 VITALS — OXYGEN SATURATION: 97 %

## 2018-03-06 VITALS — HEART RATE: 129 BPM

## 2018-03-06 VITALS — HEART RATE: 119 BPM

## 2018-03-06 VITALS — HEART RATE: 116 BPM

## 2018-03-06 LAB
BASOPHILS # BLD AUTO: 0 TH/MM3 (ref 0–0.2)
BASOPHILS NFR BLD: 0.2 % (ref 0–2)
BUN SERPL-MCNC: 64 MG/DL (ref 7–18)
CALCIUM SERPL-MCNC: 8.9 MG/DL (ref 8.5–10.1)
CHLORIDE SERPL-SCNC: 125 MEQ/L (ref 98–107)
CREAT SERPL-MCNC: 1.15 MG/DL (ref 0.5–1)
EOSINOPHIL # BLD: 0 TH/MM3 (ref 0–0.4)
EOSINOPHIL NFR BLD: 0 % (ref 0–4)
ERYTHROCYTE [DISTWIDTH] IN BLOOD BY AUTOMATED COUNT: 17.9 % (ref 11.6–17.2)
GFR SERPLBLD BASED ON 1.73 SQ M-ARVRAT: 47 ML/MIN (ref 89–?)
GLUCOSE SERPL-MCNC: 196 MG/DL (ref 74–106)
HCO3 BLD-SCNC: 22.8 MEQ/L (ref 21–32)
HCT VFR BLD CALC: 46 % (ref 35–46)
HGB BLD-MCNC: 16.1 GM/DL (ref 11.6–15.3)
LYMPHOCYTES # BLD AUTO: 0.7 TH/MM3 (ref 1–4.8)
LYMPHOCYTES NFR BLD AUTO: 4.5 % (ref 9–44)
MCH RBC QN AUTO: 32.7 PG (ref 27–34)
MCHC RBC AUTO-ENTMCNC: 35 % (ref 32–36)
MCV RBC AUTO: 93.4 FL (ref 80–100)
MONOCYTE #: 0.9 TH/MM3 (ref 0–0.9)
MONOCYTES NFR BLD: 5.7 % (ref 0–8)
NEUTROPHILS # BLD AUTO: 14.8 TH/MM3 (ref 1.8–7.7)
NEUTROPHILS NFR BLD AUTO: 89.6 % (ref 16–70)
PLATELET # BLD: 397 TH/MM3 (ref 150–450)
PMV BLD AUTO: 10.2 FL (ref 7–11)
RBC # BLD AUTO: 4.93 MIL/MM3 (ref 4–5.3)
SODIUM SERPL-SCNC: 159 MEQ/L (ref 136–145)
TOXIC GRANULES BLD QL SMEAR: (no result)
WBC # BLD AUTO: 16.6 TH/MM3 (ref 4–11)

## 2018-03-06 RX ADMIN — SODIUM CHLORIDE PRN MLS/HR: 900 INJECTION, SOLUTION INTRAVENOUS at 02:30

## 2018-03-06 RX ADMIN — AMIODARONE HYDROCHLORIDE SCH MG: 200 TABLET ORAL at 21:00

## 2018-03-06 RX ADMIN — CARVEDILOL SCH MG: 12.5 TABLET, FILM COATED ORAL at 10:34

## 2018-03-06 RX ADMIN — TAZOBACTAM SODIUM AND PIPERACILLIN SODIUM SCH MLS/HR: 375; 3 INJECTION, SOLUTION INTRAVENOUS at 11:12

## 2018-03-06 RX ADMIN — CARVEDILOL SCH MG: 12.5 TABLET, FILM COATED ORAL at 21:00

## 2018-03-06 RX ADMIN — FAMOTIDINE SCH MG: 10 INJECTION, SOLUTION INTRAVENOUS at 02:22

## 2018-03-06 RX ADMIN — CHLORHEXIDINE GLUCONATE 0.12% ORAL RINSE SCH ML: 1.2 LIQUID ORAL at 20:00

## 2018-03-06 RX ADMIN — FUROSEMIDE SCH MG: 10 INJECTION, SOLUTION INTRAMUSCULAR; INTRAVENOUS at 08:07

## 2018-03-06 RX ADMIN — FAMOTIDINE SCH MG: 10 INJECTION, SOLUTION INTRAVENOUS at 15:06

## 2018-03-06 RX ADMIN — IPRATROPIUM BROMIDE AND ALBUTEROL SULFATE PRN AMPULE: .5; 3 SOLUTION RESPIRATORY (INHALATION) at 09:56

## 2018-03-06 RX ADMIN — INSULIN ASPART SCH: 100 INJECTION, SOLUTION INTRAVENOUS; SUBCUTANEOUS at 12:59

## 2018-03-06 RX ADMIN — MORPHINE SULFATE PRN MG: 2 INJECTION, SOLUTION INTRAMUSCULAR; INTRAVENOUS at 15:06

## 2018-03-06 RX ADMIN — INSULIN ASPART SCH: 100 INJECTION, SOLUTION INTRAVENOUS; SUBCUTANEOUS at 10:03

## 2018-03-06 RX ADMIN — Medication SCH ML: at 10:04

## 2018-03-06 RX ADMIN — CHLORHEXIDINE GLUCONATE SCH PACK: 500 CLOTH TOPICAL at 02:22

## 2018-03-06 RX ADMIN — INSULIN ASPART SCH: 100 INJECTION, SOLUTION INTRAVENOUS; SUBCUTANEOUS at 04:09

## 2018-03-06 RX ADMIN — CLOPIDOGREL BISULFATE SCH MG: 75 TABLET, FILM COATED ORAL at 10:34

## 2018-03-06 RX ADMIN — AMIODARONE HYDROCHLORIDE SCH MG: 200 TABLET ORAL at 10:34

## 2018-03-06 RX ADMIN — ENOXAPARIN SODIUM SCH MG: 40 INJECTION SUBCUTANEOUS at 15:06

## 2018-03-06 RX ADMIN — MONTELUKAST SODIUM SCH MG: 10 TABLET, COATED ORAL at 21:00

## 2018-03-06 RX ADMIN — ASPIRIN SCH MG: 81 TABLET ORAL at 10:34

## 2018-03-06 RX ADMIN — Medication SCH ML: at 22:37

## 2018-03-06 RX ADMIN — CHLORHEXIDINE GLUCONATE 0.12% ORAL RINSE SCH ML: 1.2 LIQUID ORAL at 10:03

## 2018-03-06 RX ADMIN — LINEZOLID SCH MLS/HR: 600 INJECTION, SOLUTION INTRAVENOUS at 02:22

## 2018-03-06 RX ADMIN — OSELTAMIVIR PHOSPHATE SCH MG: 75 CAPSULE ORAL at 21:00

## 2018-03-06 RX ADMIN — ATORVASTATIN CALCIUM SCH MG: 80 TABLET, FILM COATED ORAL at 21:00

## 2018-03-06 RX ADMIN — LINEZOLID SCH MLS/HR: 600 INJECTION, SOLUTION INTRAVENOUS at 12:59

## 2018-03-06 RX ADMIN — METOPROLOL TARTRATE SCH MG: 1 INJECTION, SOLUTION INTRAVENOUS at 22:31

## 2018-03-06 RX ADMIN — TAZOBACTAM SODIUM AND PIPERACILLIN SODIUM SCH MLS/HR: 375; 3 INJECTION, SOLUTION INTRAVENOUS at 22:31

## 2018-03-06 RX ADMIN — METOPROLOL TARTRATE SCH MG: 1 INJECTION, SOLUTION INTRAVENOUS at 18:29

## 2018-03-06 RX ADMIN — SODIUM CHLORIDE PRN MLS/HR: 900 INJECTION, SOLUTION INTRAVENOUS at 10:05

## 2018-03-06 RX ADMIN — INSULIN ASPART SCH: 100 INJECTION, SOLUTION INTRAVENOUS; SUBCUTANEOUS at 20:00

## 2018-03-06 RX ADMIN — INSULIN ASPART SCH: 100 INJECTION, SOLUTION INTRAVENOUS; SUBCUTANEOUS at 18:29

## 2018-03-06 RX ADMIN — SODIUM CHLORIDE PRN MLS/HR: 900 INJECTION, SOLUTION INTRAVENOUS at 18:31

## 2018-03-06 RX ADMIN — OSELTAMIVIR PHOSPHATE SCH MG: 75 CAPSULE ORAL at 10:34

## 2018-03-06 RX ADMIN — ABACAVIR SULFATE SCH MG: 300 TABLET, FILM COATED ORAL at 10:34

## 2018-03-06 RX ADMIN — TAZOBACTAM SODIUM AND PIPERACILLIN SODIUM SCH MLS/HR: 375; 3 INJECTION, SOLUTION INTRAVENOUS at 18:29

## 2018-03-06 RX ADMIN — TAZOBACTAM SODIUM AND PIPERACILLIN SODIUM SCH MLS/HR: 375; 3 INJECTION, SOLUTION INTRAVENOUS at 04:09

## 2018-03-07 VITALS
RESPIRATION RATE: 40 BRPM | TEMPERATURE: 98 F | DIASTOLIC BLOOD PRESSURE: 88 MMHG | OXYGEN SATURATION: 96 % | SYSTOLIC BLOOD PRESSURE: 174 MMHG | HEART RATE: 101 BPM

## 2018-03-07 VITALS
SYSTOLIC BLOOD PRESSURE: 141 MMHG | TEMPERATURE: 97.3 F | OXYGEN SATURATION: 97 % | DIASTOLIC BLOOD PRESSURE: 69 MMHG | RESPIRATION RATE: 43 BRPM | HEART RATE: 101 BPM

## 2018-03-07 VITALS
OXYGEN SATURATION: 96 % | TEMPERATURE: 99.2 F | HEART RATE: 103 BPM | SYSTOLIC BLOOD PRESSURE: 177 MMHG | DIASTOLIC BLOOD PRESSURE: 86 MMHG | RESPIRATION RATE: 16 BRPM

## 2018-03-07 VITALS
TEMPERATURE: 97.2 F | OXYGEN SATURATION: 96 % | DIASTOLIC BLOOD PRESSURE: 89 MMHG | RESPIRATION RATE: 20 BRPM | HEART RATE: 95 BPM | SYSTOLIC BLOOD PRESSURE: 150 MMHG

## 2018-03-07 VITALS — HEART RATE: 91 BPM

## 2018-03-07 VITALS — OXYGEN SATURATION: 96 %

## 2018-03-07 VITALS — HEART RATE: 87 BPM

## 2018-03-07 VITALS — HEART RATE: 102 BPM

## 2018-03-07 VITALS
HEART RATE: 103 BPM | TEMPERATURE: 99 F | DIASTOLIC BLOOD PRESSURE: 84 MMHG | SYSTOLIC BLOOD PRESSURE: 137 MMHG | RESPIRATION RATE: 19 BRPM | OXYGEN SATURATION: 95 %

## 2018-03-07 VITALS
RESPIRATION RATE: 19 BRPM | SYSTOLIC BLOOD PRESSURE: 127 MMHG | HEART RATE: 93 BPM | OXYGEN SATURATION: 94 % | TEMPERATURE: 98 F | DIASTOLIC BLOOD PRESSURE: 71 MMHG

## 2018-03-07 VITALS — HEART RATE: 95 BPM

## 2018-03-07 VITALS — OXYGEN SATURATION: 94 %

## 2018-03-07 VITALS — HEART RATE: 100 BPM

## 2018-03-07 VITALS — HEART RATE: 103 BPM

## 2018-03-07 LAB
BUN SERPL-MCNC: 66 MG/DL (ref 7–18)
CALCIUM SERPL-MCNC: 7.9 MG/DL (ref 8.5–10.1)
CD3-/CD16+CD56+ PERCENT: 6 % (ref 4–25)
CD3-CD16+CD56+ (ABSOLUTE): 46 (ref 70–760)
CHLORIDE SERPL-SCNC: 128 MEQ/L (ref 98–107)
CREAT SERPL-MCNC: 1.07 MG/DL (ref 0.5–1)
GFR SERPLBLD BASED ON 1.73 SQ M-ARVRAT: 51 ML/MIN (ref 89–?)
GLUCOSE SERPL-MCNC: 200 MG/DL (ref 74–106)
HCO3 BLD-SCNC: 23.8 MEQ/L (ref 21–32)
LYMPHOCYTES # BLD AUTO: 740 10*3/UL (ref 850–3900)
SODIUM SERPL-SCNC: 162 MEQ/L (ref 136–145)

## 2018-03-07 RX ADMIN — LINEZOLID SCH MLS/HR: 600 INJECTION, SOLUTION INTRAVENOUS at 00:11

## 2018-03-07 RX ADMIN — METOPROLOL TARTRATE SCH MG: 1 INJECTION, SOLUTION INTRAVENOUS at 11:03

## 2018-03-07 RX ADMIN — ATORVASTATIN CALCIUM SCH MG: 80 TABLET, FILM COATED ORAL at 21:00

## 2018-03-07 RX ADMIN — FAMOTIDINE SCH MG: 10 INJECTION, SOLUTION INTRAVENOUS at 04:00

## 2018-03-07 RX ADMIN — ABACAVIR SULFATE SCH MG: 300 TABLET, FILM COATED ORAL at 09:00

## 2018-03-07 RX ADMIN — MORPHINE SULFATE PRN MG: 2 INJECTION, SOLUTION INTRAMUSCULAR; INTRAVENOUS at 13:50

## 2018-03-07 RX ADMIN — CARVEDILOL SCH MG: 12.5 TABLET, FILM COATED ORAL at 21:00

## 2018-03-07 RX ADMIN — FAMOTIDINE SCH MG: 10 INJECTION, SOLUTION INTRAVENOUS at 14:55

## 2018-03-07 RX ADMIN — POTASSIUM CHLORIDE PRN MLS/HR: 200 INJECTION, SOLUTION INTRAVENOUS at 06:10

## 2018-03-07 RX ADMIN — CHLORHEXIDINE GLUCONATE 0.12% ORAL RINSE SCH ML: 1.2 LIQUID ORAL at 08:31

## 2018-03-07 RX ADMIN — INSULIN ASPART SCH: 100 INJECTION, SOLUTION INTRAVENOUS; SUBCUTANEOUS at 20:00

## 2018-03-07 RX ADMIN — INSULIN ASPART SCH: 100 INJECTION, SOLUTION INTRAVENOUS; SUBCUTANEOUS at 17:31

## 2018-03-07 RX ADMIN — INSULIN ASPART SCH: 100 INJECTION, SOLUTION INTRAVENOUS; SUBCUTANEOUS at 08:00

## 2018-03-07 RX ADMIN — INSULIN ASPART SCH: 100 INJECTION, SOLUTION INTRAVENOUS; SUBCUTANEOUS at 00:00

## 2018-03-07 RX ADMIN — AMIODARONE HYDROCHLORIDE SCH MG: 200 TABLET ORAL at 09:00

## 2018-03-07 RX ADMIN — TAZOBACTAM SODIUM AND PIPERACILLIN SODIUM SCH MLS/HR: 375; 3 INJECTION, SOLUTION INTRAVENOUS at 23:00

## 2018-03-07 RX ADMIN — OSELTAMIVIR PHOSPHATE SCH MG: 75 CAPSULE ORAL at 09:00

## 2018-03-07 RX ADMIN — INSULIN ASPART SCH: 100 INJECTION, SOLUTION INTRAVENOUS; SUBCUTANEOUS at 04:00

## 2018-03-07 RX ADMIN — CARVEDILOL SCH MG: 12.5 TABLET, FILM COATED ORAL at 09:00

## 2018-03-07 RX ADMIN — TAZOBACTAM SODIUM AND PIPERACILLIN SODIUM SCH MLS/HR: 375; 3 INJECTION, SOLUTION INTRAVENOUS at 11:02

## 2018-03-07 RX ADMIN — ASPIRIN SCH MG: 81 TABLET ORAL at 09:00

## 2018-03-07 RX ADMIN — POTASSIUM CHLORIDE PRN MLS/HR: 200 INJECTION, SOLUTION INTRAVENOUS at 00:10

## 2018-03-07 RX ADMIN — POTASSIUM CHLORIDE PRN MLS/HR: 200 INJECTION, SOLUTION INTRAVENOUS at 04:00

## 2018-03-07 RX ADMIN — CLOPIDOGREL BISULFATE SCH MG: 75 TABLET, FILM COATED ORAL at 09:00

## 2018-03-07 RX ADMIN — Medication SCH ML: at 08:32

## 2018-03-07 RX ADMIN — SODIUM CHLORIDE PRN MLS/HR: 900 INJECTION, SOLUTION INTRAVENOUS at 12:26

## 2018-03-07 RX ADMIN — ENOXAPARIN SODIUM SCH MG: 40 INJECTION SUBCUTANEOUS at 14:55

## 2018-03-07 RX ADMIN — MONTELUKAST SODIUM SCH MG: 10 TABLET, COATED ORAL at 21:00

## 2018-03-07 RX ADMIN — POTASSIUM CHLORIDE PRN MLS/HR: 200 INJECTION, SOLUTION INTRAVENOUS at 02:24

## 2018-03-07 RX ADMIN — OSELTAMIVIR PHOSPHATE SCH MG: 75 CAPSULE ORAL at 21:00

## 2018-03-07 RX ADMIN — AMIODARONE HYDROCHLORIDE SCH MG: 200 TABLET ORAL at 21:00

## 2018-03-07 RX ADMIN — CHLORHEXIDINE GLUCONATE 0.12% ORAL RINSE SCH ML: 1.2 LIQUID ORAL at 20:00

## 2018-03-07 RX ADMIN — TAZOBACTAM SODIUM AND PIPERACILLIN SODIUM SCH MLS/HR: 375; 3 INJECTION, SOLUTION INTRAVENOUS at 05:00

## 2018-03-07 RX ADMIN — METOPROLOL TARTRATE SCH MG: 1 INJECTION, SOLUTION INTRAVENOUS at 06:10

## 2018-03-07 RX ADMIN — TAZOBACTAM SODIUM AND PIPERACILLIN SODIUM SCH MLS/HR: 375; 3 INJECTION, SOLUTION INTRAVENOUS at 17:32

## 2018-03-07 RX ADMIN — CHLORHEXIDINE GLUCONATE SCH PACK: 500 CLOTH TOPICAL at 04:00

## 2018-03-07 RX ADMIN — SODIUM CHLORIDE PRN MLS/HR: 900 INJECTION, SOLUTION INTRAVENOUS at 03:59

## 2018-03-07 RX ADMIN — INSULIN ASPART SCH: 100 INJECTION, SOLUTION INTRAVENOUS; SUBCUTANEOUS at 12:06

## 2018-03-07 RX ADMIN — LINEZOLID SCH MLS/HR: 600 INJECTION, SOLUTION INTRAVENOUS at 14:55

## 2018-03-07 RX ADMIN — MORPHINE SULFATE PRN MG: 2 INJECTION, SOLUTION INTRAMUSCULAR; INTRAVENOUS at 00:13

## 2018-03-07 RX ADMIN — SODIUM CHLORIDE SCH MLS/HR: 234 INJECTION INTRAMUSCULAR; INTRAVENOUS; SUBCUTANEOUS at 11:02

## 2018-03-07 RX ADMIN — MORPHINE SULFATE PRN MG: 2 INJECTION, SOLUTION INTRAMUSCULAR; INTRAVENOUS at 17:30

## 2018-03-07 RX ADMIN — METOPROLOL TARTRATE SCH MG: 1 INJECTION, SOLUTION INTRAVENOUS at 17:29

## 2018-03-07 NOTE — HHI.HCPN
Reason for visit


   a.  To assist with evaluation and management of symptoms including: 

shortness of breath, debility


   b.  To assist medical decision maker(s) with: better understanding of 

current medical conditions; weighing benefits/burdens of medical treatment 

options; making        


        medical treatment decisions.





Subjective/Interval History


Patient seen and examined in her room on ICU.  Patient is lethargic, briefly 

opens eyes to noxious stimulation with not tracking.  Patient softly moaning 

during physical assessment.  Patient is not following command but weakly moving 

all his extremities.  No verbal response.  Patient is currently on 4 L nasal 

cannula with O2 saturation in the mid 90s.  Heart rate mid 90s to low 100 on 15 

mg/hr Cardizem infusion.  Patient is afebrile, no reports of diarrhea per 

bedside RN. 





Telephone conversation with patient's son Omra, updated him on patient 

current medical status.  Discussed the need for an NG tube for medication 

administration and feeding patient.  Patient's son stated that he had a 

discussion with his brother and they have decided to stop aggressive treatment 

and pursue comfort care for their mother, giving her current medical status as 

well as her other multiple comorbidities. Patient has not shown much 

improvement or response to treatment since admission. Patient remains a DNR and 

patient`s son wants to continue to honor that. Patient`s son emphasized that 

his mother would not want to live in worse shape than she was already in after 

she had a stroke. He would not like to go against her wishes by having "tubes" 

placed especially if that is not going to improve the outcome. Patient`s son 

requested hospice services. Explained to him that HAART medications would be 

discontinued if she transitions to hospice. Patient`s son verbalized 

understanding.





Telephone conversation with Dr. Alfonso. Updated her conversation with patient`

s son Omar who is the health care proxy and she agrees that patient has a 

poor prognosis. 





.


Family/friend interactions


Left frontal conversation with patient's son Omar.


.





Advance Directives


Living Will:  Never completed


Health Care Surrogate:  Never completed


Durable Power of :  Never completed


Advance Directive Specifics


Health Care Surrogate(s):


Health Care Proxy -Son-Omar [email or web address]-[phone]  Email address: [email or web address]

[email or web address]


Analia Garduno [phone]


According to the [place] Statute, her 2 adult sons, Omar Moreno and Mehdi Moreno would serve as her health care proxys. Patient`s son Josh Harding 

opted out of medical decision making for his mother. 


.


Significant change in goals:


Patient son Omar Doherty wants to transition patient to comfort care only 

through hospice services.


.





Objective





Vital Signs








  Date Time  Temp Pulse Resp B/P (MAP) Pulse Ox O2 Delivery O2 Flow Rate FiO2


 


3/7/18 08:00 97.2 95 20 150/89 (109) 96   


 


3/7/18 08:00  95      


 


3/7/18 07:17     96 Nasal Cannula 4.00 


 


3/7/18 07:00     97 Nasal Cannula 4.00 


 


3/7/18 06:00  87      


 


3/7/18 04:00 99.0 103 19 137/84 (101) 95   


 


3/7/18 04:00  103      


 


3/7/18 03:59  103  150/80    


 


3/7/18 02:00  95      


 


3/7/18 00:00 99.2 103 16 177/86 (116) 96   


 


3/7/18 00:00  95      


 


3/6/18 23:51     96 Nasal Cannula 4.00 


 


3/6/18 22:00  95      


 


3/6/18 20:00 98.9 106 24 148/82 (104) 94   


 


3/6/18 20:00  106      


 


3/6/18 19:30     94 Nasal Cannula 3.00 


 


3/6/18 19:00     94 Nasal Cannula 4.00 


 


3/6/18 18:31  110  149/95    


 


3/6/18 18:28   26     


 


3/6/18 18:00  116      


 


3/6/18 16:00  120      


 


3/6/18 16:00 97.3 120 40 142/94 (110) 93   


 


3/6/18 14:00  130      


 


3/6/18 12:00  128      


 


3/6/18 12:00 97.8 128 32 142/85 (104) 93   














Intake & Output  


 


 3/7/18 3/7/18





 07:00 19:00


 


Output Total 600 ml 


 


Balance -600 ml 


 


  


 


Output Urine Total 600 ml 








Physical Exam


CONSTITUTIONAL/GENERAL: This is an adequately nourished patient, in some 

discomfort. Slightly moaning 


TUBES/LINES/DRAINS: PIV, Warner catheter, NC


SKIN: No jaundice, rashes, or lesions.  Healed mastectomy scar to the left 

breast. ecchymoses on upper and lower extremities. No wounds seen anteriorly. 

Skin temperature appropriate. Not diaphoretic. 


HEAD: Atraumatic. Normocephalic.


EYES: Pupils equal and round and reactive. Extraocular motions intact. No 

scleral icterus. No injection or drainage. Fundi not examined.


ENT: Unable to assess.  Nose without bleeding or purulent drainage. 


NECK: Trachea midline. Supple, nontender. 


CARDIOVASCULAR: Irregular rhythm without murmurs, gallops, or rubs.Low 100s. No 

JVD. Peripheral pulses symmetric.


RESPIRATORY/CHEST: Symmetric,labored respirations.  Rhonchi to auscultation.  

Diminished breath sounds in the lower lobes. No wheezes  


GASTROINTESTINAL: Abdomen soft, non-tender, nondistended. No guarding. Bowel 

sounds present.


GENITOURINARY: Without palpable bladder distension. Warner catheter in place.


MUSCULOSKELETAL: Extremities without clubbing or edema. No joint tenderness or 

effusion noted. No calf tenderness. 


NEUROLOGICAL: Lethargic, minimally responsive to noxious stimulation. Withdraws 

with all extremities. Not verbalizing 


PSYCHIATRIC: Unable to assess due to clinical condition


.





Diagnostic Tests


Laboratory





Laboratory Tests








Test


  3/4/18


13:16 3/5/18


04:25 3/6/18


18:17 3/7/18


09:41


 


White Blood Count


  16.5 TH/MM3


(4.0-11.0) 20.6 TH/MM3


(4.0-11.0) 16.6 TH/MM3


(4.0-11.0) 


 


 


Red Blood Count


  4.85 MIL/MM3


(4.00-5.30) 4.77 MIL/MM3


(4.00-5.30) 4.93 MIL/MM3


(4.00-5.30) 


 


 


Hemoglobin


  15.4 GM/DL


(11.6-15.3) 15.1 GM/DL


(11.6-15.3) 16.1 GM/DL


(11.6-15.3) 


 


 


Hematocrit


  44.6 %


(35.0-46.0) 43.5 %


(35.0-46.0) 46.0 %


(35.0-46.0) 


 


 


Mean Corpuscular Volume


  91.8 FL


(80.0-100.0) 91.3 FL


(80.0-100.0) 93.4 FL


(80.0-100.0) 


 


 


Mean Corpuscular Hemoglobin


  31.8 PG


(27.0-34.0) 31.6 PG


(27.0-34.0) 32.7 PG


(27.0-34.0) 


 


 


Mean Corpuscular Hemoglobin


Concent 34.7 %


(32.0-36.0) 34.6 %


(32.0-36.0) 35.0 %


(32.0-36.0) 


 


 


Red Cell Distribution Width


  17.8 %


(11.6-17.2) 17.5 %


(11.6-17.2) 17.9 %


(11.6-17.2) 


 


 


Platelet Count


  246 TH/MM3


(150-450) 241 TH/MM3


(150-450) 397 TH/MM3


(150-450) 


 


 


Mean Platelet Volume


  9.7 FL


(7.0-11.0) 9.8 FL


(7.0-11.0) 10.2 FL


(7.0-11.0) 


 


 


Neutrophils (%) (Auto)


  86.3 %


(16.0-70.0) 90.8 %


(16.0-70.0) 89.6 %


(16.0-70.0) 


 


 


Lymphocytes (%) (Auto)


  6.1 %


(9.0-44.0) 3.7 %


(9.0-44.0) 4.5 %


(9.0-44.0) 


 


 


Monocytes (%) (Auto)


  7.5 %


(0.0-8.0) 5.3 %


(0.0-8.0) 5.7 %


(0.0-8.0) 


 


 


Eosinophils (%) (Auto)


  0.0 %


(0.0-4.0) 0.0 %


(0.0-4.0) 0.0 %


(0.0-4.0) 


 


 


Basophils (%) (Auto)


  0.1 %


(0.0-2.0) 0.2 %


(0.0-2.0) 0.2 %


(0.0-2.0) 


 


 


Neutrophils # (Auto)


  14.2 TH/MM3


(1.8-7.7) 18.7 TH/MM3


(1.8-7.7) 14.8 TH/MM3


(1.8-7.7) 


 


 


Lymphocytes # (Auto)


  1.0 TH/MM3


(1.0-4.8) 0.8 TH/MM3


(1.0-4.8) 0.7 TH/MM3


(1.0-4.8) 


 


 


Monocytes # (Auto)


  1.2 TH/MM3


(0-0.9) 1.1 TH/MM3


(0-0.9) 0.9 TH/MM3


(0-0.9) 


 


 


Eosinophils # (Auto)


  0.0 TH/MM3


(0-0.4) 0.0 TH/MM3


(0-0.4) 0.0 TH/MM3


(0-0.4) 


 


 


Basophils # (Auto)


  0.0 TH/MM3


(0-0.2) 0.0 TH/MM3


(0-0.2) 0.0 TH/MM3


(0-0.2) 


 


 


CBC Comment AUTO DIFF  AUTO DIFF  AUTO DIFF  


 


Differential Comment


  AUTO DIFF


CONFIRMED FINAL DIFF


MANUAL AUTO DIFF


CONFIRMED 


 


 


Platelet Estimate


  NORMAL


(NORMAL) NORMAL


(NORMAL) NORMAL


(NORMAL) 


 


 


Platelet Morphology Comment


  ENLARGED


(NORMAL) NORMAL


(NORMAL) NORMAL


(NORMAL) 


 


 


Blood Urea Nitrogen


  41 MG/DL


(7-18) 44 MG/DL


(7-18) 64 MG/DL


(7-18) 66 MG/DL


(7-18)


 


Creatinine


  0.97 MG/DL


(0.50-1.00) 1.00 MG/DL


(0.50-1.00) 1.15 MG/DL


(0.50-1.00) 1.07 MG/DL


(0.50-1.00)


 


Random Glucose


  208 MG/DL


() 184 MG/DL


() 196 MG/DL


() 200 MG/DL


()


 


Total Protein


  7.3 GM/DL


(6.4-8.2) 7.1 GM/DL


(6.4-8.2) 


  


 


 


Albumin


  2.6 GM/DL


(3.4-5.0) 2.6 GM/DL


(3.4-5.0) 


  


 


 


Calcium Level


  8.8 MG/DL


(8.5-10.1) 8.7 MG/DL


(8.5-10.1) 8.9 MG/DL


(8.5-10.1) 7.9 MG/DL


(8.5-10.1)


 


Phosphorus Level


  1.7 MG/DL


(2.5-4.9) 1.7 MG/DL


(2.5-4.9) 


  


 


 


Magnesium Level


  2.0 MG/DL


(1.5-2.5) 1.9 MG/DL


(1.5-2.5) 


  


 


 


Alkaline Phosphatase


  108 U/L


() 107 U/L


() 


  


 


 


Aspartate Amino Transf


(AST/SGOT) 30 U/L (15-37) 


  40 U/L (15-37) 


  


  


 


 


Alanine Aminotransferase


(ALT/SGPT) 69 U/L (10-53) 


  61 U/L (10-53) 


  


  


 


 


Total Bilirubin


  0.4 MG/DL


(0.2-1.0) 0.6 MG/DL


(0.2-1.0) 


  


 


 


Sodium Level


  149 MEQ/L


(136-145) 153 MEQ/L


(136-145) 159 MEQ/L


(136-145) 162 MEQ/L


(136-145)


 


Potassium Level


  2.2 MEQ/L


(3.5-5.1) 3.2 MEQ/L


(3.5-5.1) 2.7 MEQ/L


(3.5-5.1) 4.1 MEQ/L


(3.5-5.1)


 


Chloride Level


  116 MEQ/L


() 120 MEQ/L


() 125 MEQ/L


() 128 MEQ/L


()


 


Carbon Dioxide Level


  21.6 MEQ/L


(21.0-32.0) 19.7 MEQ/L


(21.0-32.0) 22.8 MEQ/L


(21.0-32.0) 23.8 MEQ/L


(21.0-32.0)


 


Anion Gap


  11 MEQ/L


(5-15) 13 MEQ/L


(5-15) 11 MEQ/L


(5-15) 10 MEQ/L


(5-15)


 


Estimat Glomerular Filtration


Rate 58 ML/MIN


(>89) 56 ML/MIN


(>89) 47 ML/MIN


(>89) 51 ML/MIN


(>89)


 


Differential Total Cells


Counted 


  100 


  


  


 


 


Neutrophils % (Manual)  86 % (16-70)   


 


Lymphocytes %  6 % (9-44)   


 


Monocytes %  6 % (0-8)   


 


Neutrophils # (Manual)


  


  18.1 TH/MM3


(1.8-7.7) 


  


 


 


Myelocytes  2 % (0-0)   


 


Nucleated Red Blood Cells


  


  1 /100 WBC


(0-0) 


  


 


 


Toxic Granulation  1+ (NORMAL)  1+ (NORMAL)  


 


Absolute Lymphocytes (Cell


Immunity 


  740 (850-3900) 


  


  


 


 


Percent CD3 Cells  56 % (57-85)   


 


Absolute CD3 Count  412 (840-3060)   


 


Percent CD3-/CD16+/CD56+ Cells  6 % (4-25)   


 


Absolute CD3-/CD16+/CD56+


Count 


  46 () 


  


  


 


 


Percent CD4 Cells  26 % (30-61)   


 


Absolute CD4 Count  188 (490-1740)   


 


T-[place]/Suppressor Ratio


  


  0.9


(0.86-5.00) 


  


 


 


Percent CD8 Cells  29 % (12-42)   


 


Absolute CD8 Count  210 (180-1170)   


 


Percent CD19 Cells  36 % (6-29)   


 


Absolute CD19 Count  269 (110-660)   








Result Diagram:  


3/6/18 1817                                                                    

            3/6/18 1817








Assessment and Plan


Disease Oriented Problem List:  


(1) Bilateral multilobar pneumonia


(2) Acute kidney injury


(3) History of HIV infection


(4) Elevated liver enzymes


(5) Atrial fibrillation with RVR


(6) History of diabetes mellitus


(7) History of ischemic left MCA stroke


Symptom Scale:  


(1) Shortness of breath


0-10 Scale:  Unable to quantify


Comment:  Multifactorial.  Patient has cardiac history and COPD.  Patient also 

had influenza.  Came in with in respiratory distress chest x-ray revealed 

bilateral pneumonia.


.





(2) Debility


0-10 Scale:  Unable to quantify


Comment:  Progressive.


.





Pertinent Non-Medical Issues


Psychosocial:Patient was born in [place] and was raised in [place].  

Patient was  and  twice.  Patient his 2 adult sons.  Per son 

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


Spiritual: Patient is Adventism-Open to  visits per son Omar


Legal:Patient has a signed Community DNR


Ethical issues impacting care:None identified at this time


.


Important Contacts


Omar Haji [phone]  Email address: [email or web address]


Mehdi Haji [phone]


Sister- Stephanie Ureña-[phone]


Granddaughter-Tania [phone]


Friend-Sg Duran- [phone]


.


Prognosis


Mrs Skaggs is a 63 years old female with a past medical history of asthma, 

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 

chronic atrial fibrillation, carotid stenosis, hypertension, hepatitis C, HIV 

on HAART and GERD. Patient was brought to the ER on 2/28/18 from a nursing home 

after she was noted to be in worsening respiratory distress and low grade 

fever. Patient was being treated with Tamiflu for influenza and was on day 5 

when she came to the ER.  Clinical course complicated with respiratory failure, 

leucocytosis, atrial fibrillation with RVR and diarrhea. Given ongoing 

comorbidities, patient remains at high risk for further complications, 

deterioration and decline.


.


Code Status:  No Code


Plan


PLAN:


   Legal decision maker: Patient is currently lethargic, no verbalizing and is 

not able to participate in medical decision making. According to the [place] Statute

, patient`s son, Omar Helms will serve as her health care proxy. 





   Goals: Aggressive short of no code





   CODE STATUS: No Code DNR





SYMPTOMS:


* Shortness of breath: Multifactorial.  Patient has cardiac history and COPD.  

Patient also had influenza.  Came in with in respiratory distress. Chest x-ray 

revealed bilateral pneumonia.  Patient is on Tamiflu and antibiotics.  She is 

is off BIPAP and now on 4L NC saturating in the low to mid 90s.  Patient is 

also on Solu-Medrol Q 6HRS, morphine sulfate 2 mg Q 3HRS, duo nebsQ 4 HRS PRN.  

No recommendations at this time.


* Debility: Progressive.  Patient has a history of CVA with residual right-

sided hemiparesis and expressive aphasia. Per son, patient was ambulating with 

no assistive device at the nursing home. Patient may benefit from Physical 

therapy, speech and occupational therapy if goals remain aggressive though at 

this time patient will most likely be unable to effectively participate due to 

her respiratory and cardiac status.


.


   


   





Palliative care will continue to follow the patient during hospital course as 

condition evolves, to assist patient/decision-maker with understanding of their 

medical conditions, weighing benefits/burdens of treatment options, for 

clarification of goals of treatment.  Additionally will assist with any 

symptoms of palliative concern





Attestation


To help prompt me to consider important information that might be impacting 

today's encounter and assessment, information from prior notes written by 

myself or my colleagues may have been "brought forward" into today's note.  My 

signature on this note, however, is an attestation that I personally performed 

the exam, history, and/or decision-making noted today, and, unless otherwise 

indicated, the interactions with patient, family, and staff as well as the 

review of records all occurred today.  I also attest that the listed assessment 

and stated plan reflect my best clinical judgment today based on the 

combination of historical information, prior notes, and today's exam/ 

interactions.  When time spent is documented, it refers only to time spent 

today by the signer, or if indicated, combined time spent today by 

collaborating physician/nurse practitioner.











Nando Curry Mar 7, 2018 11:06

## 2018-03-07 NOTE — HHI.IDPN
Subjective


Subjective


Remarks


seen earlier today





lethargic


on NVC O2 


looks unfortable


Na is [id number]


Creatinine went up


Antibiotics


Abacavir, Epivir, Sustiva, NOrvir





zyvox


zosyn


tamiflu


Allergies:  


Coded Allergies:  


     acetaminophen (Unverified  Allergy, Severe, GI UPSET, 8/15/17)


     hydrocodone (Unverified  Allergy, Severe, GI UPSET, 8/15/17)





Objective


.





Vital Signs








  Date Time  Temp Pulse Resp B/P (MAP) Pulse Ox O2 Delivery O2 Flow Rate FiO2


 


3/7/18 18:00  91      


 


3/7/18 16:00  101      


 


3/7/18 16:00 97.3 101 43 141/69 (93) 97   


 


3/7/18 14:00  100      


 


3/7/18 12:26  103  174/88    


 


3/7/18 12:00  101      


 


3/7/18 12:00 98.0 101 40 174/88 (116) 96   


 


3/7/18 10:00  103      


 


3/7/18 08:00 97.2 95 20 150/89 (109) 96   


 


3/7/18 08:00  95      


 


3/7/18 07:17     96 Nasal Cannula 4.00 


 


3/7/18 07:00     97 Nasal Cannula 4.00 


 


3/7/18 06:00  87      


 


3/7/18 04:00 99.0 103 19 137/84 (101) 95   


 


3/7/18 04:00  103      


 


3/7/18 03:59  103  150/80    


 


3/7/18 02:00  95      


 


3/7/18 00:00 99.2 103 16 177/86 (116) 96   


 


3/7/18 00:00  95      


 


3/6/18 23:51     96 Nasal Cannula 4.00 


 


3/6/18 22:00  95      


 


3/6/18 20:00 98.9 106 24 148/82 (104) 94   


 


3/6/18 20:00  106      














 3/7/18 3/7/18 3/8/18





 15:00 23:00 07:00


 


Intake Total  0 ml 


 


Output Total  825 ml 


 


Balance  -825 ml 


 


   


 


Intake Oral  0 ml 


 


Output Urine Total  825 ml 


 


# Bowel Movements  1 








.





Laboratory Tests








Test


  3/6/18


18:17


 


White Blood Count 16.6 TH/MM3 


 


Red Blood Count 4.93 MIL/MM3 


 


Hemoglobin 16.1 GM/DL 


 


Hematocrit 46.0 % 


 


Mean Corpuscular Volume 93.4 FL 


 


Mean Corpuscular Hemoglobin 32.7 PG 


 


Mean Corpuscular Hemoglobin


Concent 35.0 % 


 


 


Red Cell Distribution Width 17.9 % 


 


Platelet Count 397 TH/MM3 


 


Mean Platelet Volume 10.2 FL 


 


Neutrophils (%) (Auto) 89.6 % 


 


Lymphocytes (%) (Auto) 4.5 % 


 


Monocytes (%) (Auto) 5.7 % 


 


Eosinophils (%) (Auto) 0.0 % 


 


Basophils (%) (Auto) 0.2 % 


 


Neutrophils # (Auto) 14.8 TH/MM3 


 


Lymphocytes # (Auto) 0.7 TH/MM3 


 


Monocytes # (Auto) 0.9 TH/MM3 


 


Eosinophils # (Auto) 0.0 TH/MM3 


 


Basophils # (Auto) 0.0 TH/MM3 


 


CBC Comment AUTO DIFF 


 


Differential Comment


  AUTO DIFF


CONFIRMED


 


Toxic Granulation 1+ 


 


Platelet Estimate NORMAL 


 


Platelet Morphology Comment NORMAL 








Laboratory Tests








Test


  3/6/18


18:17 3/7/18


09:41


 


Blood Urea Nitrogen 64 MG/DL  66 MG/DL 


 


Creatinine 1.15 MG/DL  1.07 MG/DL 


 


Random Glucose 196 MG/DL  200 MG/DL 


 


Calcium Level 8.9 MG/DL  7.9 MG/DL 


 


Sodium Level 159 MEQ/L  162 MEQ/L 


 


Potassium Level 2.7 MEQ/L  4.1 MEQ/L 


 


Chloride Level 125 MEQ/L  128 MEQ/L 


 


Carbon Dioxide Level 22.8 MEQ/L  23.8 MEQ/L 


 


Anion Gap 11 MEQ/L  10 MEQ/L 


 


Estimat Glomerular Filtration


Rate 47 ML/MIN 


  51 ML/MIN 


 








Imaging


   


Last Impressions








Chest X-Ray 3/4/18 0000 Signed





Impressions: 





 Service Date/Time:  Sunday, March 4, 2018 09:04 - CONCLUSION: The improvement 





 with less interstitial edema.      Lito Salomon MD  FACR


 


Liver Ultrasound 2/28/18 0000 Signed





Impressions: 





 Service Date/Time:  Wednesday, February 28, 2018 13:48 - CONCLUSION:  1. Mild 





 increased echotexture of the liver may indicate steatosis. 2. Cholelithiasis. 

   





  Yg Rodarte MD 


 


Chest CT 2/28/18 0000 Signed





Impressions: 





 Service Date/Time:  Wednesday, February 28, 2018 13:27 - CONCLUSION:  Patchy 





 airspace disease scattered consolidation both lungs.  No pleural effusion.    

  





 Lito Salomon MD  FACR








Physical Exam


CONSTITUTIONAL/GENERAL: This is a morbidly obese patient, in mild to moderated  

resp distress.


 SKIN: No jaundice, rashes, or lesions.   Skin temperature appropriate. Not 

diaphoretic. 


BREASTS: mastectomy scar L breast - well healed


HEAD: Atraumatic. Normocephalic.


EYES: Pupils equal and round and reactive. Extraocular motions intact. No 

scleral icterus. No injection or drainage. Fundi not examined.


ENT: dry mucosae  


CARDIOVASCULAR: Regular rate and rhythm without murmurs, gallops, or rubs. No 

JVD. Peripheral pulses symmetric. Perifery well perfused 


RESPIRATORY/CHEST: Symmetric, unlabored respirations. cont to have diffuse b/l  

rhonchi to auscultation. Breath sounds equal bilaterally.   


GASTROINTESTINAL: Abdomen soft, non-tender, distended. No hepato-splenomegaly, 

or palpable masses. No guarding. Bowel sounds present.


GENITOURINARY: Without palpable bladder distension. 


MUSCULOSKELETAL: Extremities without clubbing, cyanosis, or edema.  


NEUROLOGICAL: totaolly obtunded; not following





Assessment & Plan


Remarks


Assessment and Plan


Influenza


 PNA


Impending resp failure: requires non ivasive vent'n 


HIV, unknown immunosuppression status


Morbid obesety


COPD


Chronic tobaccoism


Abx associated  diarrhea - new issue 


   - appears to resolve


Gram positive bactremia 2/2 different organisms - cw contamination


Worsenig leukocytosis


Dry


Prognosis is poor - agree with consulting hospice








   cont current abx


   cont  tamiflu


   cont  HAART  


   fu sputum clx


   fu blood clx


   chk stool for C.diff if again diarrhea 


 


dw palliative











Maria Teresa Hammer MD Mar 7, 2018 20:12

## 2018-03-08 VITALS
TEMPERATURE: 98.2 F | DIASTOLIC BLOOD PRESSURE: 71 MMHG | OXYGEN SATURATION: 96 % | SYSTOLIC BLOOD PRESSURE: 153 MMHG | RESPIRATION RATE: 17 BRPM | HEART RATE: 91 BPM

## 2018-03-08 VITALS
OXYGEN SATURATION: 97 % | SYSTOLIC BLOOD PRESSURE: 159 MMHG | RESPIRATION RATE: 18 BRPM | DIASTOLIC BLOOD PRESSURE: 83 MMHG | TEMPERATURE: 97.5 F | HEART RATE: 92 BPM

## 2018-03-08 VITALS
TEMPERATURE: 97.7 F | SYSTOLIC BLOOD PRESSURE: 152 MMHG | OXYGEN SATURATION: 96 % | HEART RATE: 110 BPM | RESPIRATION RATE: 18 BRPM | DIASTOLIC BLOOD PRESSURE: 85 MMHG

## 2018-03-08 VITALS — HEART RATE: 86 BPM

## 2018-03-08 VITALS
SYSTOLIC BLOOD PRESSURE: 158 MMHG | DIASTOLIC BLOOD PRESSURE: 72 MMHG | TEMPERATURE: 98.1 F | RESPIRATION RATE: 17 BRPM | OXYGEN SATURATION: 96 % | HEART RATE: 86 BPM

## 2018-03-08 VITALS
SYSTOLIC BLOOD PRESSURE: 174 MMHG | OXYGEN SATURATION: 97 % | HEART RATE: 83 BPM | DIASTOLIC BLOOD PRESSURE: 79 MMHG | TEMPERATURE: 98.2 F | RESPIRATION RATE: 17 BRPM

## 2018-03-08 VITALS — OXYGEN SATURATION: 95 %

## 2018-03-08 VITALS — HEART RATE: 89 BPM

## 2018-03-08 VITALS — HEART RATE: 92 BPM

## 2018-03-08 VITALS — SYSTOLIC BLOOD PRESSURE: 166 MMHG | DIASTOLIC BLOOD PRESSURE: 76 MMHG

## 2018-03-08 VITALS — HEART RATE: 87 BPM

## 2018-03-08 RX ADMIN — MORPHINE SULFATE PRN MG: 2 INJECTION, SOLUTION INTRAMUSCULAR; INTRAVENOUS at 15:59

## 2018-03-08 RX ADMIN — OSELTAMIVIR PHOSPHATE SCH MG: 75 CAPSULE ORAL at 09:00

## 2018-03-08 RX ADMIN — LINEZOLID SCH MLS/HR: 600 INJECTION, SOLUTION INTRAVENOUS at 05:24

## 2018-03-08 RX ADMIN — INSULIN ASPART SCH: 100 INJECTION, SOLUTION INTRAVENOUS; SUBCUTANEOUS at 08:00

## 2018-03-08 RX ADMIN — CLOPIDOGREL BISULFATE SCH MG: 75 TABLET, FILM COATED ORAL at 09:00

## 2018-03-08 RX ADMIN — METOPROLOL TARTRATE SCH MG: 1 INJECTION, SOLUTION INTRAVENOUS at 10:39

## 2018-03-08 RX ADMIN — INSULIN ASPART SCH: 100 INJECTION, SOLUTION INTRAVENOUS; SUBCUTANEOUS at 00:00

## 2018-03-08 RX ADMIN — METOPROLOL TARTRATE SCH MG: 1 INJECTION, SOLUTION INTRAVENOUS at 00:03

## 2018-03-08 RX ADMIN — CARVEDILOL SCH MG: 12.5 TABLET, FILM COATED ORAL at 09:00

## 2018-03-08 RX ADMIN — ENOXAPARIN SODIUM SCH MG: 40 INJECTION SUBCUTANEOUS at 14:02

## 2018-03-08 RX ADMIN — FAMOTIDINE SCH MG: 10 INJECTION, SOLUTION INTRAVENOUS at 14:03

## 2018-03-08 RX ADMIN — MORPHINE SULFATE PRN MG: 2 INJECTION, SOLUTION INTRAMUSCULAR; INTRAVENOUS at 00:01

## 2018-03-08 RX ADMIN — METOPROLOL TARTRATE SCH MG: 1 INJECTION, SOLUTION INTRAVENOUS at 05:25

## 2018-03-08 RX ADMIN — CHLORHEXIDINE GLUCONATE SCH PACK: 500 CLOTH TOPICAL at 04:00

## 2018-03-08 RX ADMIN — MORPHINE SULFATE PRN MG: 2 INJECTION, SOLUTION INTRAMUSCULAR; INTRAVENOUS at 10:41

## 2018-03-08 RX ADMIN — INSULIN ASPART SCH: 100 INJECTION, SOLUTION INTRAVENOUS; SUBCUTANEOUS at 05:35

## 2018-03-08 RX ADMIN — INSULIN ASPART SCH: 100 INJECTION, SOLUTION INTRAVENOUS; SUBCUTANEOUS at 15:57

## 2018-03-08 RX ADMIN — SODIUM CHLORIDE SCH MLS/HR: 234 INJECTION INTRAMUSCULAR; INTRAVENOUS; SUBCUTANEOUS at 05:23

## 2018-03-08 RX ADMIN — AMIODARONE HYDROCHLORIDE SCH MG: 200 TABLET ORAL at 09:00

## 2018-03-08 RX ADMIN — LINEZOLID SCH MLS/HR: 600 INJECTION, SOLUTION INTRAVENOUS at 14:03

## 2018-03-08 RX ADMIN — METOPROLOL TARTRATE SCH MG: 1 INJECTION, SOLUTION INTRAVENOUS at 15:57

## 2018-03-08 RX ADMIN — CHLORHEXIDINE GLUCONATE 0.12% ORAL RINSE SCH ML: 1.2 LIQUID ORAL at 08:00

## 2018-03-08 RX ADMIN — SODIUM CHLORIDE PRN MLS/HR: 900 INJECTION, SOLUTION INTRAVENOUS at 09:29

## 2018-03-08 RX ADMIN — SODIUM CHLORIDE PRN MLS/HR: 900 INJECTION, SOLUTION INTRAVENOUS at 00:00

## 2018-03-08 RX ADMIN — ABACAVIR SULFATE SCH MG: 300 TABLET, FILM COATED ORAL at 09:00

## 2018-03-08 RX ADMIN — Medication SCH ML: at 00:01

## 2018-03-08 RX ADMIN — ASPIRIN SCH MG: 81 TABLET ORAL at 09:00

## 2018-03-08 RX ADMIN — FAMOTIDINE SCH MG: 10 INJECTION, SOLUTION INTRAVENOUS at 05:25

## 2018-03-08 RX ADMIN — Medication SCH ML: at 09:24

## 2018-03-08 RX ADMIN — INSULIN ASPART SCH: 100 INJECTION, SOLUTION INTRAVENOUS; SUBCUTANEOUS at 12:00

## 2018-03-08 RX ADMIN — TAZOBACTAM SODIUM AND PIPERACILLIN SODIUM SCH MLS/HR: 375; 3 INJECTION, SOLUTION INTRAVENOUS at 05:00

## 2018-03-08 RX ADMIN — TAZOBACTAM SODIUM AND PIPERACILLIN SODIUM SCH MLS/HR: 375; 3 INJECTION, SOLUTION INTRAVENOUS at 11:00

## 2018-03-08 NOTE — HHI.DS
__________________________________________________





Discharge Summary


Admission Date


Feb 28, 2018 at 13:09


Discharge Date:  Mar 8, 2018


Admitting Diagnosis





acute hypoxemic resp failure, HCAP,afib with rvr,DNR





(1) Acute hypoxemic respiratory failure


ICD Code:  J96.01 - Acute respiratory failure with hypoxia


Diagnosis:  Principal


Status:  Acute


(2) Acute exacerbation of chronic obstructive pulmonary disease (COPD)


ICD Code:  J44.1 - Chronic obstructive pulmonary disease with (acute) 

exacerbation


Diagnosis:  Principal





(3) Severe sepsis


ICD Code:  A41.9 - Sepsis, unspecified organism; R65.20 - Severe sepsis without 

septic shock


Diagnosis:  Principal





(4) Healthcare-associated pneumonia


ICD Code:  J18.9 - Pneumonia, unspecified organism


Diagnosis:  Principal





(5) Influenza


ICD Code:  J11.1 - Influenza due to unidentified influenza virus with other 

respiratory manifestations


Diagnosis:  Secondary





(6) Acute kidney injury


ICD Code:  N17.9 - Acute kidney failure, unspecified


Diagnosis:  Secondary





(7) Atrial fibrillation with RVR


ICD Code:  I48.91 - Unspecified atrial fibrillation


Diagnosis:  Secondary


Status:  Acute


(8) Expressive aphasia


ICD Code:  R47.01 - Aphasia


Diagnosis:  Secondary


Status:  Acute


(9) Depression


ICD Code:  F32.9 - Major depressive disorder, single episode, unspecified


Diagnosis:  Secondary


Status:  Chronic


(10) Hypertension


ICD Code:  I10 - Essential (primary) hypertension


Diagnosis:  Secondary


Status:  Chronic


(11) HLD (hyperlipidemia)


ICD Code:  E78.5 - Hyperlipidemia, unspecified


Diagnosis:  Secondary


Status:  Chronic


(12) Left acute arterial ischemic stroke, MCA (middle cerebral artery)


ICD Code:  I63.512 - Cerebral infarction due to unspecified occlusion or 

stenosis of left middle cerebral artery


Diagnosis:  Principal


Status:  Acute


(13) History of diabetes mellitus


ICD Code:  Z86.39 - Personal history of other endocrine, nutritional and 

metabolic disease


Diagnosis:  Secondary


Status:  Chronic


(14) Left carotid stenosis


ICD Code:  I65.22 - Occlusion and stenosis of left carotid artery


Diagnosis:  Secondary


Status:  Acute


Procedures


none


Brief History - From Admission


Patient is a 63-year-old  female with history of CVA in the past with 

right hemiparesis, expressive aphasia, coronary artery disease, chronic atrial 

fibrillation, carotid stenosis, HIV on HAART who was brought in from NH for 

severe respiratory distress and hypoxemia. Currently DNR. Recently diagnosed 

with influenza pneumonia currently on day 5 of Tamiflu.  Fever of 100.7 in the 

ED. patient was in severe respiratory distress and hypoxemic immediately placed 

on BiPAP 70% oxygen.  Chest x-ray showed bibasilar infiltrate later confirmed 

with CT chest as multilobar infiltrate/pneumonia.  Patient received IV Solu-

Medrol and IV Zosyn after 2 blood culture obtained.  Patient also also found to 

be in A. fib with RVR with rates in 140s. CBC showed WBC count 12.2. BUN/cr was 

46/1.5 and was normal about a week ago.  Critical care medicine was requested 

to admit the patient.





I evaluated the patient in the ED.  She still appears to be in moderate 

respiratory distress but BiPAP had been removed per patient request.  

Significant respiratory distress and expressive aphasia limits exam.  Patient 

is breathing approximately 40 breaths per minute, bilateral wheezing and coarse 

crackles heard.  I have added scheduled Solu-Medrol, DuoNeb.  Tamiflu will be 

continued, continue Zosyn and add Zyvox. ID consulted. Continue BiPAP at 12/5.  

Prognosis is guarded at this time


CBC/BMP:  


3/6/18 1817                                                                    

            3/7/18 0941





Significant Findings





Laboratory Tests








Test


  3/6/18


18:17 3/7/18


09:41


 


White Blood Count


  16.6 TH/MM3


(4.0-11.0) 


 


 


Hemoglobin


  16.1 GM/DL


(11.6-15.3) 


 


 


Red Cell Distribution Width


  17.9 %


(11.6-17.2) 


 


 


Neutrophils (%) (Auto)


  89.6 %


(16.0-70.0) 


 


 


Lymphocytes (%) (Auto)


  4.5 %


(9.0-44.0) 


 


 


Neutrophils # (Auto)


  14.8 TH/MM3


(1.8-7.7) 


 


 


Lymphocytes # (Auto)


  0.7 TH/MM3


(1.0-4.8) 


 


 


Toxic Granulation 1+ (NORMAL)  


 


Blood Urea Nitrogen


  64 MG/DL


(7-18) 66 MG/DL


(7-18)


 


Creatinine


  1.15 MG/DL


(0.50-1.00) 1.07 MG/DL


(0.50-1.00)


 


Random Glucose


  196 MG/DL


() 200 MG/DL


()


 


Sodium Level


  159 MEQ/L


(136-145) 162 MEQ/L


(136-145)


 


Potassium Level


  2.7 MEQ/L


(3.5-5.1) 


 


 


Chloride Level


  125 MEQ/L


() 128 MEQ/L


()


 


Estimat Glomerular Filtration


Rate 47 ML/MIN


(>89) 51 ML/MIN


(>89)


 


Calcium Level


  


  7.9 MG/DL


(8.5-10.1)








Imaging





Last Impressions








Chest X-Ray 3/4/18 0000 Signed





Impressions: 





 Service Date/Time:  Sunday, March 4, 2018 09:04 - CONCLUSION: The improvement 





 with less interstitial edema.      Lito Salomon MD  FA[facility]


 


Liver Ultrasound 2/28/18 0000 Signed





Impressions: 





 Service Date/Time:  Wednesday, February 28, 2018 13:48 - CONCLUSION:  1. Mild 





 increased echotexture of the liver may indicate steatosis. 2. Cholelithiasis. 

   





  Yg Rodarte MD 


 


Chest CT 2/28/18 0000 Signed





Impressions: 





 Service Date/Time:  Wednesday, February 28, 2018 13:27 - CONCLUSION:  Patchy 





 airspace disease scattered consolidation both lungs.  No pleural effusion.    

  





 Lito Salomon MD  FA[facility]








PE at Discharge


lethargic


pupils equal


lungs- decrease breath sounds, no rales or wheezes


rate controlled- on Cardizem drip


abdomen= soft


extremities no edema


L sided extremities- flaccid


Pt update on day of discharge


lethargic


Hospital Course


Neuro/Psych:


History of left MCA stroke with right hemiparesis


Depression


Peripheral neuropathy


Encephalopathy


Continue aspirin/Plavix


PT/OT once stabilized respiratory wise


Hold as needed Xanax, Prozac, Neurontin, Ambien


3/3 suspect encephalopathy secondary to CO2 narcosis since patient is in 

respiratory distress on BiPAP.  I will obtain an ABG.  Continue to monitor 

neurological status.


3/4 encephalopathy likely secondary to metabolic encephalopathy due to 

hypernatremia.


Palliative care on board


Hospice consult








CV: 


Atrial fibrillation with rapid ventricular response- rate controlled 


CAD with History of non-STEMI


Dyslipidemia


Left carotid stenosis


History of hypertension


Chronic A. fib


 Cardizem infusion for rate control- continue . Start IV Lopressor 5 mg q 6


Continue Home medications Coreg and amiodarone, hold p.o. Cardizem, and losartan

- po meds not being given- lethargic


Continue aspirin Plavix


 2 D Echo. Echocardiogram in 2015 EF around 50%.


3/3 patient on A. fib with RVR.  Currently not on Cardizem infusion.  Heart 

rate in the high 130s.  Ordered EKG stat.  Will give Cardizem IV bolus and 

placed on Cardizem drip.  Will check cardiac enzymes, TSH free T4.


Continue Cardizem drip at 10 mg/hr.    TSH was slightly decreased at 0.119, 

however free T4 1.20. 





Acute hypoxemic respiratory failure


Multilobar pneumonia recent influenza


Acute COPD exacerbation


Off BiPAP


DuoNeb every 4 hours scheduled and as needed


Broad-spectrum antibiotics with Zosyn, Zyvox, Tamiflu


3/3 x-ray shows deterioration with decreasing interstitial changes in both lungs

, reviewed by me and likely consistent with acute pulmonary edema.  I will 

obtain an ABG stat, ordered Lasix 60 mg IV once and 40 mg IV twice daily, 125 

mg of IV Solu-Medrol and 60 g IV every 8 hours.  Discontinue IV fluids.  Order 

stat labs.  Discussed the case with Dr. Alcantar who states the patient DNR/DNI.  

Nurse is trying to get in touch with family or contact available.  I will 

change the BiPAP setting to 12/5.


off BiPAP.  Continue IV Solu-Medrol which I will increase to 60 mg IV every 6 

hours.  Continue Lasix 40 mg IV twice daily.


Continue antibiotics as per ID.  Continue IV Zosyn.








GI: 


History of hepatitis C


History diverticulosis


Elevated liver enzymes


Advance PO as tolerated.


IV Famotidine








:  


Acute kidney injury


Warner will be placed for accurate I's and O's in a critically ill patient


IVF as above





Endo:  


Diabetes mellitus


Place on sliding scale insulin.  Accu-Cheks to maintain euglycemia


3.  For blood sugar severely elevated





Heme:


Monitor CBC daily.





ID:


Bilateral pneumonia healthcare associated


Reason influenza


HIV


Placed on Zosyn and Zyvox, and continue Tamiflu for influenza with superadded 

bacterial pneumonia


Continue HAART


Antibiotics as per ID.





ENDO:


Hypokalemia


Hypophosphatemia.


Hyponatremia -encourage oral intake of fluids.  Monitor BMP


Replace electrolytes as clinically indicated for electrolyte protocol


3/3.  Obtain stat labs, CBC with differential, BMP, magnesium and phosphorus.


3/4 potassium is severely depleted at 2.2, phosphorus is low at 1.7..  Will 

replace as per the ICU electrolyte protocol.  Monitor BMP.








Access


Utilized peripheral IVs





Prophylaxis


GI - Protonix


DVT - SCD/Lovenox subcutaneous








Discharge Planning


Patient with poor prognosis and severe respiratory distress.  


Case discussed with Omar- son and staff nurse


PAtient is DNR/DNI.





[id number]- d/w Hospice nurse- Jayashree- spoke with family- discharge to Hospice care 

center today


   comfort measures


Pt Condition on Discharge:  Guarded


Discharge Disposition:  Hospice/Med Facility


Discharge Time:  > 30 minutes


Discharge Instructions


DIET: Follow Instructions for:  Nothing By Mouth


Activities you can perform:  Continue Bedrest











Lucila Oliver MD Mar 8, 2018 16:50

## 2019-11-21 NOTE — HHI.PR
Addendum to Inpatient Note


Additional Information


Pt seen  and examined


chart reviewed


pt is a very poor historian


Full note to follow 





Brifly:


64 yo with HIV (Dr Rose, CD4 unknown) / HAART reported in the chart reviewed

, tobaccosism, COPD,  L breast ca, HCV


preseted from nursing home with  Influenza, PNA and resp insufficiency


On BIPAP


NOt expectorating 





On exam:


comfortabl;e on 50% FiO2 BIPAP


 partial face mask


Diffuse rhonchi on aiscultation


RRR


Benign obese abdomen


Well perfused warm toes, fingers





CT with patcy infiltrates


BC P


Sputum not obtainable 2/2 lack of expectoration





ABx: Tamiflu, zosyn, zyvox, HAART (ABC/Tenofovir, Norvir)





Plan: cont current abx


will need records re HAART


sputum clx











Maria Teresa Hammer MD Feb 28, 2018 19:51
Addendum to Inpatient Note


Additional Information


Pt seen today around 1330


full note to follow





Doing worse


R side rhorere casiano with hospice consult











Maria Teresa Hammer MD Mar 7, 2018 15:52
Subjective


Remarks


Deferred entry, the patient was seen earlier at 9:55 AM.


The patient is still lethargic but arousable.


WBC is trending down.


Potassium is severely low.


Sodium is elevated.





Objective


Vitals





Vital Signs








  Date Time  Temp Pulse Resp B/P (MAP) Pulse Ox O2 Delivery O2 Flow Rate FiO2


 


3/4/18 14:00  115      


 


3/4/18 12:00 99.8 114 22 150/98 (115) 98   


 


3/4/18 12:00  115      


 


3/4/18 10:00  109      


 


3/4/18 09:30  117 23  97   


 


3/4/18 09:00  114 19 154/83 (106) 95   


 


3/4/18 08:43     97   50


 


3/4/18 08:30  112 23  97   


 


3/4/18 08:00  111 21 179/85 (116) 97   


 


3/4/18 08:00 99.0 120 20 154/83 (106) 99   


 


3/4/18 08:00  109      


 


3/4/18 06:46  109  150/82    


 


3/4/18 06:00  109      


 


3/4/18 04:00  116      


 


3/4/18 04:00 99.2 116 20 163/100 (121) 94   


 


3/4/18 02:58     94   50


 


3/4/18 02:58     93   50


 


3/4/18 02:00  109      


 


3/4/18 00:00     91 Nasal Cannula  50





      Bi-Pap  


 


3/4/18 00:00 99.4 112 18 129/78 (95) 91   


 


3/4/18 00:00  112      


 


3/3/18 23:53     94   50


 


3/3/18 22:00  112      


 


3/3/18 21:27     96   50


 


3/3/18 20:00  114      


 


3/3/18 20:00 97.9 114 20 148/73 (98) 91   


 


3/3/18 19:22     96   50


 


3/3/18 19:00     91 Nasal Cannula  50





      Bi-Pap  


 


3/3/18 19:00  116  141/83    


 


3/3/18 17:58  112  156/87    


 


3/3/18 16:00 98.0 94 22 146/84 (104) 94   


 


3/3/18 16:00  113      














I/O      


 


 3/3/18 3/3/18 3/3/18 3/4/18 3/4/18 3/4/18





 07:00 15:00 23:00 07:00 15:00 23:00


 


Intake Total 690 ml 350 ml 589 ml 705 ml  


 


Output Total   1000 ml 1250 ml  


 


Balance 690 ml 350 ml -411 ml -545 ml  


 


      


 


Intake Oral 240 ml     


 


IV Total 450 ml 350 ml 589 ml 705 ml  


 


Output Urine Total   1000 ml 1250 ml  


 


# Voids 4     


 


# Bowel Movements 2     








Result Diagram:  


3/4/18 1316                                                                    

            3/4/18 1316





Imaging





Last Impressions








Chest X-Ray 3/4/18 0000 Signed





Impressions: 





 Service Date/Time:  Sunday, March 4, 2018 09:04 - CONCLUSION: The improvement 





 with less interstitial edema.      Lito Salomon MD  FACR


 


Liver Ultrasound 2/28/18 0000 Signed





Impressions: 





 Service Date/Time:  Wednesday, February 28, 2018 13:48 - CONCLUSION:  1. Mild 





 increased echotexture of the liver may indicate steatosis. 2. Cholelithiasis. 

   





  Yg Rodarte MD 


 


Chest CT 2/28/18 0000 Signed





Impressions: 





 Service Date/Time:  Wednesday, February 28, 2018 13:27 - CONCLUSION:  Patchy 





 airspace disease scattered consolidation both lungs.  No pleural effusion.    

  





 Lito Salomon MD  FACR








Objective Remarks


Patient mild to moderate resp distress on ventimask 


Cyanotic. 


There is bilateral rales in both lungs, diminished overall.


S1-S2 present, tachycardic, no murmurs rubs or gallops appreciated.


Trace edema in lower extremities.


Medications and IVs





Current Medications








 Medications


  (Trade)  Dose


 Ordered  Sig/Rebecca


 Route  Start Time


 Stop Time Status Last Admin


 


  (NS Flush)  2 ml  UNSCH  PRN


 IV FLUSH  2/28/18 13:15


     


 


 


  (NS Flush)  2 ml  BID


 IV FLUSH  2/28/18 13:15


    3/4/18 09:00


 


 


  (Duoneb Neb)  1 ampule  Q4HR NEB  PRN


 INH  2/28/18 13:15


     


 


 


  (Peridex 0.12%


 Liq)  15 ml  BID@08,20


 MT  2/28/18 20:00


    3/4/18 08:00


 


 


 Miscellaneous


 Information  1  Q361D


 XX  2/28/18 13:15


     


 


 


  (Chlorhexidine


 2% Cloth)  3 pack


 Taper  DAILY@04


 TOP  3/1/18 04:00


 2/25/19 03:59  3/4/18 03:51


 


 


  (Chlorhexidine


 2% Cloth)  3 pack  UNSCH  PRN


 TOP  2/28/18 13:15


     


 


 


 Linezolid  300 ml @ 


 300 mls/hr  Q12H


 IV  2/28/18 14:00


    3/4/18 12:53


 


 


  (Tamiflu)  75 mg  BID


 PO  2/28/18 21:00


    3/4/18 08:58


 


 


  (Cordarone)  300 mg  Q12HR


 PO  2/28/18 21:00


    3/4/18 08:58


 


 


  (Coreg)  12.5 mg  BID


 PO  2/28/18 21:00


    3/4/18 08:58


 


 


  (Plavix)  75 mg  DAILY


 PO  3/1/18 09:00


    3/4/18 08:58


 


 


  (Singulair)  10 mg  HS


 PO  2/28/18 21:00


    3/2/18 20:26


 


 


  (Ecotrin Ec)  81 mg  DAILY


 PO  3/1/18 09:00


    3/4/18 08:58


 


 


  (Sustiva)  600 mg  HS


 PO  2/28/18 21:00


    3/2/18 20:27


 


 


  (Lipitor)  80 mg  HS


 PO  2/28/18 21:00


    3/2/18 20:26


 


 


 Piperacillin Sod/


 Tazobactam Sod  50 ml @ 


 100 mls/hr  Q6H


 IV  2/28/18 17:00


    3/4/18 11:00


 


 


 Diltiazem HCl 125


 mg/Sodium Chloride  125 ml @ 5


 mls/hr  TITRATE  PRN


 IV  2/28/18 14:15


     


 


 


  (Duoneb Neb)  1 ampule  Q4HR  NEB


 NEB  2/28/18 16:00


    3/4/18 14:02


 


 


  (NovoLOG


 SUPPLEMENTAL


 SCALE)  1  Q4HR


 SQ  2/28/18 16:00


    3/4/18 12:52


 


 


  (D50w (Vial) Inj)  50 ml  UNSCH  PRN


 IV PUSH  2/28/18 14:45


     


 


 


  (Glucagon Inj)  1 mg  UNSCH  PRN


 OTHER  2/28/18 14:45


     


 


 


  (Epivir)  300 mg  DAILY


 PO  3/1/18 09:00


    3/4/18 08:58


 


 


  (Ziagen)  600 mg  DAILY


 PO  3/1/18 09:00


    3/4/18 08:59


 


 


  (Morphine Inj)  2 mg  Q3H  PRN


 IV PUSH  2/28/18 15:45


    3/3/18 13:20


 


 


  (Lovenox Inj)  40 mg  Q24H


 SQ  3/2/18 15:00


    3/3/18 15:00


 


 


  (Pepcid Inj)  20 mg  Q12H


 IV PUSH  3/2/18 15:00


    3/4/18 01:28


 


 


  (Apresoline Inj)  20 mg  Q4H  PRN


 IV  3/2/18 23:45


    3/4/18 04:09


 


 


  (Lasix Inj)  40 mg  BID@09,18


 IV PUSH  3/3/18 18:00


    3/4/18 08:59


 


 


 Diltiazem HCl 125


 mg/Sodium Chloride  125 ml @ 5


 mls/hr  TITRATE  PRN


 IV  3/3/18 17:45


    3/4/18 06:46


 


 


  (Cardizem Inj)  20 mg  UNSCH X1  PRN


 IV PUSH  3/3/18 18:00


 3/6/18 17:59   


 


 


  (SoluMEDROL INJ)  60 mg  Q6HR


 IV PUSH  3/4/18 12:00


    3/4/18 12:53


 


 


 Sodium Phosphate


 15 mmol/Sodium


 Chloride  155 ml @ 


 38.75 mls/


 hr  ONCE  ONCE


 IV  3/4/18 15:00


 3/4/18 18:59 UNV  


 


 


 Potassium Chloride  100 ml @ 


 50 mls/hr  Q2H  PRN


 IV  3/4/18 15:00


   UNV  


 


 


 Potassium Chloride  100 ml @ 


 50 mls/hr  Q2H  PRN


 IV  3/4/18 15:00


   UNV  


 


 


  (K-Lyte Cl  Eff)  50 meq  UNSCH  PRN


 PO  3/4/18 15:00


   UNV  


 


 


 Potassium Chloride  100 ml @ 


 25 mls/hr  UNSCH  PRN


 IV  3/4/18 15:00


   UNV  


 


 


 Potassium Chloride  100 ml @ 


 50 mls/hr  Q2H  PRN


 IV  3/4/18 15:00


   UNV  


 


 


 Magnesium Sulfate


 4 gm/Sodium


 Chloride  100 ml @ 


 50 mls/hr  UNSCH  PRN


 IV  3/4/18 15:00


   UNV  


 


 


  (Mag-Ox)  800 mg  UNSCH  PRN


 PO  3/4/18 15:00


   UNV  


 


 


 Magnesium Sulfate


 2 gm/Sodium


 Chloride  100 ml @ 


 50 mls/hr  UNSCH  PRN


 IV  3/4/18 15:00


   UNV  


 


 


  (K-Phos)  2,000 mg  Q4H  PRN


 PO  3/4/18 15:00


   UNV  


 


 


 Sodium Phosphate


 30 mmol/Sodium


 Chloride  250 ml @ 


 42 mls/hr  UNSCH  PRN


 IV  3/4/18 15:00


   UNV  


 


 


  (K-Phos)  2,000 mg  UNSCH  PRN


 PO/TUBE  3/4/18 15:00


   UNV  


 


 


 Potassium


 Phosphate 30 mmol/


 Sodium Chloride  260 ml @ 


 42 mls/hr  UNSCH  PRN


 IV  3/4/18 15:00


   UNV  


 











A/P


Problem List:  


(1) Acute hypoxemic respiratory failure


ICD Code:  J96.01 - Acute respiratory failure with hypoxia


Status:  Acute


(2) Acute exacerbation of chronic obstructive pulmonary disease (COPD)


ICD Code:  J44.1 - Chronic obstructive pulmonary disease with (acute) 

exacerbation


(3) Severe sepsis


ICD Code:  A41.9 - Sepsis, unspecified organism; R65.20 - Severe sepsis without 

septic shock


(4) Healthcare-associated pneumonia


ICD Code:  J18.9 - Pneumonia, unspecified organism


(5) Bilateral multilobar pneumonia


(6) Influenza


ICD Code:  J11.1 - Influenza due to unidentified influenza virus with other 

respiratory manifestations


(7) Acute kidney injury


ICD Code:  N17.9 - Acute kidney failure, unspecified


(8) Atrial fibrillation with RVR


ICD Code:  I48.91 - Unspecified atrial fibrillation


Status:  Acute


(9) Atrial fibrillation


ICD Code:  I48.91 - Unspecified atrial fibrillation


Status:  Acute


(10) Expressive aphasia


ICD Code:  R47.01 - Aphasia


Status:  Acute


(11) Depression


ICD Code:  F32.9 - Major depressive disorder, single episode, unspecified


Status:  Chronic


(12) Hypertension


ICD Code:  I10 - Essential (primary) hypertension


Status:  Chronic


(13) HLD (hyperlipidemia)


ICD Code:  E78.5 - Hyperlipidemia, unspecified


Status:  Chronic


(14) Left acute arterial ischemic stroke, MCA (middle cerebral artery)


ICD Code:  I63.512 - Cerebral infarction due to unspecified occlusion or 

stenosis of left middle cerebral artery


Status:  Acute


(15) History of diabetes mellitus


ICD Code:  Z86.39 - Personal history of other endocrine, nutritional and 

metabolic disease


Status:  Chronic


(16) Left carotid stenosis


ICD Code:  I65.22 - Occlusion and stenosis of left carotid artery


Status:  Acute


Assessment and Plan


Neuro/Psych:


History of left MCA stroke with right hemiparesis


Depression


Peripheral neuropathy


Encephalopathy


Continue aspirin/Plavix


PT/OT once stabilized respiratory wise


Hold as needed Xanax, Prozac, Neurontin, Ambien


3/3 suspect encephalopathy secondary to CO2 narcosis since patient is in 

respiratory distress on BiPAP.  I will obtain an ABG.  Continue to monitor 

neurological status.


3/4 encephalopathy likely secondary to metabolic encephalopathy due to 

hypernatremia.


Consult palliative care.





CV: 


Atrial fibrillation with rapid ventricular response


CAD with History of non-STEMI


Dyslipidemia


Left carotid stenosis


History of hypertension


Chronic A. fib


 Cardizem infusion for rate control


Continue Home medications Coreg and amiodarone, hold p.o. Cardizem, and losartan


Continue aspirin Plavix


Check 2 D Echo. Echocardiogram in 2015 EF around 50%.


3/3 patient on A. fib with RVR.  Currently not on Cardizem infusion.  Heart 

rate in the high 130s.  Ordered EKG stat.  Will give Cardizem IV bolus and 

placed on Cardizem drip.  Will check cardiac enzymes, TSH free T4.


3/4 patient still with A. fib with RVR however better controlled.  Continue 

Cardizem drip.  And consult cardiology.  TSH was slightly decreased at 0.119, 

however free T4 1.20.  Will check free T3.





Acute hypoxemic respiratory failure


Multilobar pneumonia recent influenza


Acute COPD exacerbation


Off BiPAP


DuoNeb every 4 hours scheduled and as needed


Broad-spectrum antibiotics with Zosyn, Zyvox, Tamiflu


3/3 x-ray shows deterioration with decreasing interstitial changes in both lungs

, reviewed by me and likely consistent with acute pulmonary edema.  I will 

obtain an ABG stat, ordered Lasix 60 mg IV once and 40 mg IV twice daily, 125 

mg of IV Solu-Medrol and 60 g IV every 8 hours.  Discontinue IV fluids.  Order 

stat labs.  Discussed the case with Dr. Alcantar who states the patient DNR/DNI.  

Nurse is trying to get in touch with family or contact available.  I will 

change the BiPAP setting to 12/5.


3/4 respiratory failure is improving.  The patient is off BiPAP.  Continue IV 

Solu-Medrol which I will increase to 60 mg IV every 6 hours.  Continue Lasix 40 

mg IV twice daily.


Continue antibiotics as per ID.  Continue IV Zosyn.








GI: 


History of hepatitis C


History diverticulosis


Elevated liver enzymes


Advance PO as tolerated.


IV Famotidine


Check liver and gallbladder ultrasound





:  


Acute kidney injury


Warner will be placed for accurate I's and O's in a critically ill patient


IVF as above





Endo:  


Diabetes mellitus


Place on sliding scale insulin.  Accu-Cheks to maintain euglycemia


3.  For blood sugar severely elevated





Heme:


Monitor CBC daily.





ID:


Bilateral pneumonia healthcare associated


Reason influenza


HIV


Placed on Zosyn and Zyvox, and continue Tamiflu for influenza with superadded 

bacterial pneumonia


Continue HAART


Antibiotics as per ID.





ENDO:


Hypokalemia


Hypophosphatemia.


Hyponatremia -encourage oral intake of fluids.  Monitor BMP


Replace electrolytes as clinically indicated for electrolyte protocol


3/3.  Obtain stat labs, CBC with differential, BMP, magnesium and phosphorus.


3/4 potassium is severely depleted at 2.2, phosphorus is low at 1.7..  Will 

replace as per the ICU electrolyte protocol.  Monitor BMP.








Access


Utilized peripheral IVs





Prophylaxis


GI - Protonix


DVT - SCD/Lovenox subcutaneous


Discharge Planning


Patient with poor prognosis and severe respiratory distress.  Case discussed 

with intensivist Dr. Alcantar, RN.


PAtient is DNR/DNI.


45 minutes of critical care time is spent on patient care.





Problem Qualifiers





(1) Atrial fibrillation:  


Qualified Codes:  I48.91 - Unspecified atrial fibrillation


(2) Depression:  


Qualified Codes:  F32.9 - Major depressive disorder, single episode, unspecified








Shay Jacobs MD Mar 4, 2018 15:22
Subjective


Remarks


Patient with severe respiratory distress currently on BiPAP at 50%.


The patient is lethargic but arousable.


Patient is satting 93-94%.





Objective


Vitals





Vital Signs








  Date Time  Temp Pulse Resp B/P (MAP) Pulse Ox O2 Delivery O2 Flow Rate FiO2


 


3/3/18 16:00 98.0 94 22 146/84 (104) 94   


 


3/3/18 16:00  113      


 


3/3/18 14:58     94   50


 


3/3/18 14:00  113      


 


3/3/18 12:00  99      


 


3/3/18 12:00 98.0 110 22 177/85 (115) 94   


 


3/3/18 10:50     98   50


 


3/3/18 10:00  99      


 


3/3/18 08:00 97.5 107 22 160/90 (113) 94   


 


3/3/18 08:00  99      


 


3/3/18 07:32     96   50


 


3/3/18 06:00  108      


 


3/3/18 04:00  107      


 


3/3/18 04:00 97.5 107 22 151/85 (107) 94   


 


3/3/18 03:57     95   50


 


3/3/18 02:00  108      


 


3/3/18 00:59     97 Non-Rebreather 15.00 100


 


3/3/18 00:38     95   50


 


3/3/18 00:00  92 31 207/104 (138) 92   


 


3/3/18 00:00  92      


 


3/2/18 22:00  94      


 


3/2/18 20:02  96 32 190/89 (122) 93   


 


3/2/18 20:00  99      


 


3/2/18 20:00 97.5 99 36 181/103 (129) 94   


 


3/2/18 19:38     93 Nasal Cannula 6.00 


 


3/2/18 19:00     95 Nasal Cannula 6.00 


 


3/2/18 18:00  94      














I/O      


 


 3/2/18 3/2/18 3/2/18 3/3/18 3/3/18 3/3/18





 07:00 15:00 23:00 07:00 15:00 23:00


 


Intake Total 120 ml  1070 ml 690 ml  


 


Output Total 500 ml  100 ml   


 


Balance -380 ml  970 ml 690 ml  


 


      


 


Intake Oral 120 ml  520 ml 240 ml  


 


IV Total   550 ml 450 ml  


 


Output Urine Total 500 ml  100 ml   


 


# Voids   3 4  


 


# Bowel Movements 3  2 2  








Result Diagram:  


3/2/18 0400                                                                    

            3/2/18 0400





Imaging





Last Impressions








Chest X-Ray 3/3/18 0000 Signed





Impressions: 





 Service Date/Time:  Saturday, March 3, 2018 16:36 - CONCLUSION: Deterioration 





 with increasing interstitial changes both lungs     Lito Salomon MD  FACR


 


Liver Ultrasound 2/28/18 0000 Signed





Impressions: 





 Service Date/Time:  Wednesday, February 28, 2018 13:48 - CONCLUSION:  1. Mild 





 increased echotexture of the liver may indicate steatosis. 2. Cholelithiasis. 

   





  Yg Rodarte MD 


 


Chest CT 2/28/18 0000 Signed





Impressions: 





 Service Date/Time:  Wednesday, February 28, 2018 13:27 - CONCLUSION:  Patchy 





 airspace disease scattered consolidation both lungs.  No pleural effusion.    

  





 Lito Salomon MD  FACR








Objective Remarks


Patient is in severe respiratory distress using accessory muscles of 

respiration.


Cyanotic. 


There is bilateral rales in both lungs, diminished overall.


S1-S2 present, tachycardic, no murmurs rubs or gallops appreciated.


Trace edema in lower extremities.


Medications and IVs





Last Impressions








Chest X-Ray 3/3/18 0000 Signed





Impressions: 





 Service Date/Time:  Saturday, March 3, 2018 16:36 - CONCLUSION: Deterioration 





 with increasing interstitial changes both lungs     Lito Salomon MD  FACR


 


Liver Ultrasound 2/28/18 0000 Signed





Impressions: 





 Service Date/Time:  Wednesday, February 28, 2018 13:48 - CONCLUSION:  1. Mild 





 increased echotexture of the liver may indicate steatosis. 2. Cholelithiasis. 

   





  Yg Rodarte MD 


 


Chest CT 2/28/18 0000 Signed





Impressions: 





 Service Date/Time:  Wednesday, February 28, 2018 13:27 - CONCLUSION:  Patchy 





 airspace disease scattered consolidation both lungs.  No pleural effusion.    

  





 Lito Salomon MD  FACR











A/P


Problem List:  


(1) Acute hypoxemic respiratory failure


ICD Code:  J96.01 - Acute respiratory failure with hypoxia


Status:  Acute


(2) Acute exacerbation of chronic obstructive pulmonary disease (COPD)


ICD Code:  J44.1 - Chronic obstructive pulmonary disease with (acute) 

exacerbation


(3) Severe sepsis


ICD Code:  A41.9 - Sepsis, unspecified organism; R65.20 - Severe sepsis without 

septic shock


(4) Healthcare-associated pneumonia


ICD Code:  J18.9 - Pneumonia, unspecified organism


(5) Bilateral multilobar pneumonia


(6) Influenza


ICD Code:  J11.1 - Influenza due to unidentified influenza virus with other 

respiratory manifestations


(7) Acute kidney injury


ICD Code:  N17.9 - Acute kidney failure, unspecified


(8) Atrial fibrillation with RVR


ICD Code:  I48.91 - Unspecified atrial fibrillation


Status:  Acute


(9) Atrial fibrillation


ICD Code:  I48.91 - Unspecified atrial fibrillation


Status:  Acute


(10) Expressive aphasia


ICD Code:  R47.01 - Aphasia


Status:  Acute


(11) Depression


ICD Code:  F32.9 - Major depressive disorder, single episode, unspecified


Status:  Chronic


(12) Hypertension


ICD Code:  I10 - Essential (primary) hypertension


Status:  Chronic


(13) HLD (hyperlipidemia)


ICD Code:  E78.5 - Hyperlipidemia, unspecified


Status:  Chronic


(14) Left acute arterial ischemic stroke, MCA (middle cerebral artery)


ICD Code:  I63.512 - Cerebral infarction due to unspecified occlusion or 

stenosis of left middle cerebral artery


Status:  Acute


(15) History of diabetes mellitus


ICD Code:  Z86.39 - Personal history of other endocrine, nutritional and 

metabolic disease


Status:  Chronic


(16) Left carotid stenosis


ICD Code:  I65.22 - Occlusion and stenosis of left carotid artery


Status:  Acute


Assessment and Plan


Neuro/Psych:


History of left MCA stroke with right hemiparesis


Depression


Peripheral neuropathy


Encephalopathy


Continue aspirin/Plavix


PT/OT once stabilized respiratory wise


Hold as needed Xanax, Prozac, Neurontin, Ambien


3/3 suspect colopathy secondary to CO2 narcosis since patient is in respiratory 

distress on BiPAP.  I will obtain an ABG.  Continue to monitor neurological 

status.





CV: 


Atrial fibrillation with rapid ventricular response


CAD with History of non-STEMI


Dyslipidemia


Left carotid stenosis


History of hypertension


Chronic A. fib


 Cardizem infusion for rate control


Continue Home medications Coreg and amiodarone, hold p.o. Cardizem, and losartan


Continue aspirin Plavix


Check 2 D Echo. Echocardiogram in 2015 EF around 50%.


3/3 patient on A. fib with RVR.  Currently not on Cardizem infusion.  Heart 

rate in the high 130s.  Ordered EKG stat.  Will give Cardizem IV bolus and 

placed on Cardizem drip.  Will check cardiac enzymes, TSH free T4.





Resp: 


Acute hypoxemic respiratory failure


Multilobar pneumonia recent influenza


Acute COPD exacerbation


Off BiPAP


DuoNeb every 4 hours scheduled and as needed


Broad-spectrum antibiotics with Zosyn, Zyvox, Tamiflu


3/3 x-ray shows deterioration with decreasing interstitial changes in both lungs

, reviewed by me and likely consistent with acute pulmonary edema.  I will 

obtain an ABG stat, ordered Lasix 60 mg IV once and 40 mg IV twice daily, 125 

mg of IV Solu-Medrol and 16 g IV every 8 hours.  Discontinue IV fluids.  Order 

stat labs.  Discussed the case with Dr. Alcantar who states the patient DNR/DNI.  

Nurse is trying to get in touch with family or contact available.  I will 

change the BiPAP setting to 12/5.








GI: 


History of hepatitis C


History diverticulosis


Elevated liver enzymes


Advance PO as tolerated.


IV Famotidine


Check liver and gallbladder ultrasound





:  


Acute kidney injury


Warner will be placed for accurate I's and O's in a critically ill patient


IVF as above





Endo:  


Diabetes mellitus


Place on sliding scale insulin.  Accu-Cheks to maintain euglycemia





Heme:


Monitor CBC daily.





ID:


Bilateral pneumonia healthcare associated


Reason influenza


HIV


Placed on Zosyn and Zyvox, and continue Tamiflu for influenza with superadded 

bacterial pneumonia


Continue HAART


Antibiotics as per ID.





ENDO:


Hypokalemia


Replace electrolytes as clinically indicated for electrolyte protocol


3/3.  Obtain stat labs, CBC with differential, BMP, magnesium and phosphorus.





Access


Utilized peripheral IVs





Prophylaxis


GI - Protonix


DVT - SCD/Lovenox subcutaneous


Discharge Planning


Patient with poor prognosis and severe respiratory distress.  Case discussed 

with intensivist Dr. Alcantar, RN.


PAtient is DNR/DNI.


45 minutes of critical care time is spent on patient care.





Problem Qualifiers





(1) Atrial fibrillation:  


Qualified Codes:  I48.91 - Unspecified atrial fibrillation


(2) Depression:  


Qualified Codes:  F32.9 - Major depressive disorder, single episode, unspecified








Shay Jacobs MD Mar 3, 2018 17:45
Subjective


Remarks


drowsy, open to eyes to call of her name


non verbal





Objective


Vitals





Vital Signs








  Date Time  Temp Pulse Resp B/P (MAP) Pulse Ox O2 Delivery O2 Flow Rate FiO2


 


3/6/18 16:00  120      


 


3/6/18 16:00 97.3 120 40 142/94 (110) 93   


 


3/6/18 14:00  130      


 


3/6/18 12:00  128      


 


3/6/18 12:00 97.8 128 32 142/85 (104) 93   


 


3/6/18 10:15     93 Nasal Cannula 4.00 


 


3/6/18 10:05  130  133/81    


 


3/6/18 10:00  129      


 


3/6/18 09:56     96 Nasal Cannula 3.00 


 


3/6/18 08:00  116      


 


3/6/18 08:00 97.9 116 20 147/88 (107) 97   


 


3/6/18 07:07     99   40


 


3/6/18 07:00     99 Bi-Pap  40


 


3/6/18 06:00  119      


 


3/6/18 04:00  119      


 


3/6/18 04:00 99.0 119 19 146/73 (97) 98   


 


3/6/18 03:45     97 BiPAP  50


 


3/6/18 03:45     97   50


 


3/6/18 02:30  125  135/85    


 


3/6/18 02:00  125      


 


3/6/18 01:08     98   50


 


3/6/18 00:00 98.8 127 18 165/83 (110) 97   


 


3/6/18 00:00  127      


 


3/5/18 22:00  127      


 


3/5/18 21:24     98   50


 


3/5/18 20:00  129      


 


3/5/18 20:00 99.6 129 19 165/83 (110) 97   


 


3/5/18 19:00     97 Bi-Pap  50


 


3/5/18 19:00  126  168/86    


 


3/5/18 18:00  129      


 


3/5/18 17:49  126  151/82    














I/O      


 


 3/5/18 3/5/18 3/5/18 3/6/18 3/6/18 3/6/18





 07:00 15:00 23:00 07:00 15:00 23:00


 


Intake Total 650 ml 50 ml 350 ml 574 ml  


 


Output Total 1650 ml  1300 ml 1200 ml  


 


Balance -1000 ml 50 ml -950 ml -626 ml  


 


      


 


Intake Oral   0 ml   


 


IV Total 650 ml 50 ml 350 ml 574 ml  


 


Output Urine Total 1650 ml  1300 ml 1200 ml  


 


# Bowel Movements   0 0  








Result Diagram:  


3/5/18 0425                                                                    

            3/5/18 0425





Imaging





Last Impressions








Chest X-Ray 3/4/18 0000 Signed





Impressions: 





 Service Date/Time:  Sunday, March 4, 2018 09:04 - CONCLUSION: The improvement 





 with less interstitial edema.      Lito Salomon MD  [facility]


 


Liver Ultrasound 2/28/18 0000 Signed





Impressions: 





 Service Date/Time:  Wednesday, February 28, 2018 13:48 - CONCLUSION:  1. Mild 





 increased echotexture of the liver may indicate steatosis. 2. Cholelithiasis. 

   





  Yg Rodarte MD 


 


Chest CT 2/28/18 0000 Signed





Impressions: 





 Service Date/Time:  Wednesday, February 28, 2018 13:27 - CONCLUSION:  Patchy 





 airspace disease scattered consolidation both lungs.  No pleural effusion.    

  





 Lito Salomon MD  FACR








Objective Remarks


drowsy


pupils equal


lungs- decrease breath sounds


tachycardic


abdomen= soft


extremities no edema


 L sided extremities- flaccid


Urinary Catheter:  Yes


Assessment to:  Continue


[employer]ner insert reason:  Prolonged Immobilization





A/P


Problem List:  


(1) Acute hypoxemic respiratory failure


ICD Code:  J96.01 - Acute respiratory failure with hypoxia


Status:  Acute


(2) Acute exacerbation of chronic obstructive pulmonary disease (COPD)


ICD Code:  J44.1 - Chronic obstructive pulmonary disease with (acute) 

exacerbation


(3) Severe sepsis


ICD Code:  A41.9 - Sepsis, unspecified organism; R65.20 - Severe sepsis without 

septic shock


(4) Healthcare-associated pneumonia


ICD Code:  J18.9 - Pneumonia, unspecified organism


(5) Bilateral multilobar pneumonia


(6) Influenza


ICD Code:  J11.1 - Influenza due to unidentified influenza virus with other 

respiratory manifestations


(7) Acute kidney injury


ICD Code:  N17.9 - Acute kidney failure, unspecified


(8) Atrial fibrillation with RVR


ICD Code:  I48.91 - Unspecified atrial fibrillation


Status:  Acute


(9) Atrial fibrillation


ICD Code:  I48.91 - Unspecified atrial fibrillation


Status:  Acute


(10) Expressive aphasia


ICD Code:  R47.01 - Aphasia


Status:  Acute


(11) Depression


ICD Code:  F32.9 - Major depressive disorder, single episode, unspecified


Status:  Chronic


(12) Hypertension


ICD Code:  I10 - Essential (primary) hypertension


Status:  Chronic


(13) HLD (hyperlipidemia)


ICD Code:  E78.5 - Hyperlipidemia, unspecified


Status:  Chronic


(14) Left acute arterial ischemic stroke, MCA (middle cerebral artery)


ICD Code:  I63.512 - Cerebral infarction due to unspecified occlusion or 

stenosis of left middle cerebral artery


Status:  Acute


(15) History of diabetes mellitus


ICD Code:  Z86.39 - Personal history of other endocrine, nutritional and 

metabolic disease


Status:  Chronic


(16) Left carotid stenosis


ICD Code:  I65.22 - Occlusion and stenosis of left carotid artery


Status:  Acute


Assessment and Plan


Neuro/Psych:


History of left MCA stroke with right hemiparesis


Depression


Peripheral neuropathy


Encephalopathy


Continue aspirin/Plavix


PT/OT once stabilized respiratory wise


Hold as needed Xanax, Prozac, Neurontin, Ambien


3/3 suspect encephalopathy secondary to CO2 narcosis since patient is in 

respiratory distress on BiPAP.  I will obtain an ABG.  Continue to monitor 

neurological status.


3/4 encephalopathy likely secondary to metabolic encephalopathy due to 

hypernatremia.


Palliative care on board


3/6- d/w alisa and Omar- - son- wants to meet with Palliative and discuss 

possible hospice to determine goals of care


also d/w him MS- and need for NGT vs PEG- not decided on that now





CV: 


Atrial fibrillation with rapid ventricular response


CAD with History of non-STEMI


Dyslipidemia


Left carotid stenosis


History of hypertension


Chronic A. fib


 Cardizem infusion for rate control- continue . Start IV Lopressor 5 mg q 6


Continue Home medications Coreg and amiodarone, hold p.o. Cardizem, and losartan

- po meds not being given- lethargic


Continue aspirin Plavix


 2 D Echo. Echocardiogram in 2015 EF around 50%.


3/3 patient on A. fib with RVR.  Currently not on Cardizem infusion.  Heart 

rate in the high 130s.  Ordered EKG stat.  Will give Cardizem IV bolus and 

placed on Cardizem drip.  Will check cardiac enzymes, TSH free T4.


3/4 patient still with A. fib with RVR however better controlled.  Continue 

Cardizem drip.    TSH was slightly decreased at 0.119, however free T4 1.20. 


3/6- still tachycardi- a fib- on cardizme dri[p- not taking po coreg, amiodaron


   start IV 5 mg Lopressor q 6





Acute hypoxemic respiratory failure


Multilobar pneumonia recent influenza


Acute COPD exacerbation


Off BiPAP


DuoNeb every 4 hours scheduled and as needed


Broad-spectrum antibiotics with Zosyn, Zyvox, Tamiflu


3/3 x-ray shows deterioration with decreasing interstitial changes in both lungs

, reviewed by me and likely consistent with acute pulmonary edema.  I will 

obtain an ABG stat, ordered Lasix 60 mg IV once and 40 mg IV twice daily, 125 

mg of IV Solu-Medrol and 60 g IV every 8 hours.  Discontinue IV fluids.  Order 

stat labs.  Discussed the case with Dr. Alcantar who states the patient DNR/DNI.  

Nurse is trying to get in touch with family or contact available.  I will 

change the BiPAP setting to 12/5.


3/4 respiratory failure is improving.  The patient is off BiPAP.  Continue IV 

Solu-Medrol which I will increase to 60 mg IV every 6 hours.  Continue Lasix 40 

mg IV twice daily.


Continue antibiotics as per ID.  Continue IV Zosyn.








GI: 


History of hepatitis C


History diverticulosis


Elevated liver enzymes


Advance PO as tolerated.


IV Famotidine


Check liver and gallbladder ultrasound





:  


Acute kidney injury


Warner will be placed for accurate I's and O's in a critically ill patient


IVF as above





Endo:  


Diabetes mellitus


Place on sliding scale insulin.  Accu-Cheks to maintain euglycemia


3.  For blood sugar severely elevated





Heme:


Monitor CBC daily.





ID:


Bilateral pneumonia healthcare associated


Reason influenza


HIV


Placed on Zosyn and Zyvox, and continue Tamiflu for influenza with superadded 

bacterial pneumonia


Continue HAART


Antibiotics as per ID.





ENDO:


Hypokalemia


Hypophosphatemia.


Hyponatremia -encourage oral intake of fluids.  Monitor BMP


Replace electrolytes as clinically indicated for electrolyte protocol


3/3.  Obtain stat labs, CBC with differential, BMP, magnesium and phosphorus.


3/4 potassium is severely depleted at 2.2, phosphorus is low at 1.7..  Will 

replace as per the ICU electrolyte protocol.  Monitor BMP.








Access


Utilized peripheral IVs





Prophylaxis


GI - Protonix


DVT - SCD/Lovenox subcutaneous


Discharge Planning


Patient with poor prognosis and severe respiratory distress.  


Case discussed with Omar- son and staff nurse


PAtient is DNR/DNI.





Problem Qualifiers





(1) Atrial fibrillation:  


Qualified Codes:  I48.91 - Unspecified atrial fibrillation


(2) Depression:  


Qualified Codes:  F32.9 - Major depressive disorder, single episode, unspecified








Lucila Oliver MD Mar 6, 2018 17:48
Subjective


Remarks


lethargic- did open her eyes and looked around to call of her name





Objective


Vitals





Vital Signs








  Date Time  Temp Pulse Resp B/P (MAP) Pulse Ox O2 Delivery O2 Flow Rate FiO2


 


3/8/18 10:46   17     


 


3/8/18 10:00  86      


 


3/8/18 09:29  93  165/74    


 


3/8/18 09:28  89  165/71    


 


3/8/18 08:00  86      


 


3/8/18 08:00 98.1 86 17 158/72 (100) 96   


 


3/8/18 07:00     96 Nasal Cannula 4.00 


 


3/8/18 07:00     95   21


 


3/8/18 06:00  89      


 


3/8/18 04:00 98.2 91 17 153/71 (98) 96   


 


3/8/18 04:00  91      


 


3/8/18 02:00  86      


 


3/8/18 00:00  110      


 


3/8/18 00:00 97.7 110 18 152/85 (107) 96   


 


3/8/18 00:00  111  150/101    


 


3/7/18 22:00  102      


 


3/7/18 20:00 98.0 93 19 127/71 (89) 94   


 


3/7/18 20:00  93      


 


3/7/18 20:00     93 Nasal Cannula 4.00 


 


3/7/18 19:48     94 Nasal Cannula 4.00 


 


3/7/18 18:00  91      


 


3/7/18 16:00  101      


 


3/7/18 16:00 97.3 101 43 141/69 (93) 97   


 


3/7/18 14:00  100      


 


3/7/18 12:26  103  174/88    














I/O      


 


 3/7/18 3/7/18 3/7/18 3/8/18 3/8/18 3/8/18





 07:00 15:00 23:00 07:00 15:00 23:00


 


Intake Total   350 ml 1400 ml  


 


Output Total 600 ml  825 ml 650 ml  


 


Balance -600 ml  -475 ml 750 ml  


 


      


 


Intake Oral   0 ml   


 


IV Total   350 ml 1400 ml  


 


Output Urine Total 600 ml  825 ml 650 ml  


 


# Bowel Movements   1   








Result Diagram:  


3/6/18 1817                                                                    

            3/7/18 0941





Imaging





Last Impressions








Chest X-Ray 3/4/18 0000 Signed





Impressions: 





 Service Date/Time:  Sunday, March 4, 2018 09:04 - CONCLUSION: The improvement 





 with less interstitial edema.      Lito Salomon MD  FACR


 


Liver Ultrasound 2/28/18 0000 Signed





Impressions: 





 Service Date/Time:  Wednesday, February 28, 2018 13:48 - CONCLUSION:  1. Mild 





 increased echotexture of the liver may indicate steatosis. 2. Cholelithiasis. 

   





  Yg Rodarte MD 


 


Chest CT 2/28/18 0000 Signed





Impressions: 





 Service Date/Time:  Wednesday, February 28, 2018 13:27 - CONCLUSION:  Patchy 





 airspace disease scattered consolidation both lungs.  No pleural effusion.    

  





 Lito Salomon MD  FACR








Objective Remarks


lethargic


pupils equal


lungs- decrease breath sounds, no rales or wheezes


rate controlled- on Cardizem drip


abdomen= soft


extremities no edema


L sided extremities- flaccid





A/P


Problem List:  


(1) Acute hypoxemic respiratory failure


ICD Code:  J96.01 - Acute respiratory failure with hypoxia


Status:  Acute


(2) Acute exacerbation of chronic obstructive pulmonary disease (COPD)


ICD Code:  J44.1 - Chronic obstructive pulmonary disease with (acute) 

exacerbation


(3) Severe sepsis


ICD Code:  A41.9 - Sepsis, unspecified organism; R65.20 - Severe sepsis without 

septic shock


(4) Healthcare-associated pneumonia


ICD Code:  J18.9 - Pneumonia, unspecified organism


(5) Bilateral multilobar pneumonia


(6) Influenza


ICD Code:  J11.1 - Influenza due to unidentified influenza virus with other 

respiratory manifestations


(7) Acute kidney injury


ICD Code:  N17.9 - Acute kidney failure, unspecified


(8) Atrial fibrillation with RVR


ICD Code:  I48.91 - Unspecified atrial fibrillation


Status:  Acute


(9) Atrial fibrillation


ICD Code:  I48.91 - Unspecified atrial fibrillation


Status:  Acute


(10) Expressive aphasia


ICD Code:  R47.01 - Aphasia


Status:  Acute


(11) Depression


ICD Code:  F32.9 - Major depressive disorder, single episode, unspecified


Status:  Chronic


(12) Hypertension


ICD Code:  I10 - Essential (primary) hypertension


Status:  Chronic


(13) HLD (hyperlipidemia)


ICD Code:  E78.5 - Hyperlipidemia, unspecified


Status:  Chronic


(14) Left acute arterial ischemic stroke, MCA (middle cerebral artery)


ICD Code:  I63.512 - Cerebral infarction due to unspecified occlusion or 

stenosis of left middle cerebral artery


Status:  Acute


(15) History of diabetes mellitus


ICD Code:  Z86.39 - Personal history of other endocrine, nutritional and 

metabolic disease


Status:  Chronic


(16) Left carotid stenosis


ICD Code:  I65.22 - Occlusion and stenosis of left carotid artery


Status:  Acute


Assessment and Plan


Neuro/Psych:


History of left MCA stroke with right hemiparesis


Depression


Peripheral neuropathy


Encephalopathy


Continue aspirin/Plavix


PT/OT once stabilized respiratory wise


Hold as needed Xanax, Prozac, Neurontin, Ambien


3/3 suspect encephalopathy secondary to CO2 narcosis since patient is in 

respiratory distress on BiPAP.  I will obtain an ABG.  Continue to monitor 

neurological status.


3/4 encephalopathy likely secondary to metabolic encephalopathy due to 

hypernatremia.


Palliative care on board


Hospice consult








CV: 


Atrial fibrillation with rapid ventricular response- rate controlled 


CAD with History of non-STEMI


Dyslipidemia


Left carotid stenosis


History of hypertension


Chronic A. fib


 Cardizem infusion for rate control- continue . Start IV Lopressor 5 mg q 6


Continue Home medications Coreg and amiodarone, hold p.o. Cardizem, and losartan

- po meds not being given- lethargic


Continue aspirin Plavix


 2 D Echo. Echocardiogram in 2015 EF around 50%.


3/3 patient on A. fib with RVR.  Currently not on Cardizem infusion.  Heart 

rate in the high 130s.  Ordered EKG stat.  Will give Cardizem IV bolus and 

placed on Cardizem drip.  Will check cardiac enzymes, TSH free T4.


Continue Cardizem drip at 10 mg/hr.    TSH was slightly decreased at 0.119, 

however free T4 1.20. 





Acute hypoxemic respiratory failure


Multilobar pneumonia recent influenza


Acute COPD exacerbation


Off BiPAP


DuoNeb every 4 hours scheduled and as needed


Broad-spectrum antibiotics with Zosyn, Zyvox, Tamiflu


3/3 x-ray shows deterioration with decreasing interstitial changes in both lungs

, reviewed by me and likely consistent with acute pulmonary edema.  I will 

obtain an ABG stat, ordered Lasix 60 mg IV once and 40 mg IV twice daily, 125 

mg of IV Solu-Medrol and 60 g IV every 8 hours.  Discontinue IV fluids.  Order 

stat labs.  Discussed the case with Dr. Alcantar who states the patient DNR/DNI.  

Nurse is trying to get in touch with family or contact available.  I will 

change the BiPAP setting to 12/5.


3/4 respiratory failure is improving.  The patient is off BiPAP.  Continue IV 

Solu-Medrol which I will increase to 60 mg IV every 6 hours.  Continue Lasix 40 

mg IV twice daily.


Continue antibiotics as per ID.  Continue IV Zosyn.








GI: 


History of hepatitis C


History diverticulosis


Elevated liver enzymes


Advance PO as tolerated.


IV Famotidine


Check liver and gallbladder ultrasound





:  


Acute kidney injury


Warner will be placed for accurate I's and O's in a critically ill patient


IVF as above





Endo:  


Diabetes mellitus


Place on sliding scale insulin.  Accu-Cheks to maintain euglycemia


3.  For blood sugar severely elevated





Heme:


Monitor CBC daily.





ID:


Bilateral pneumonia healthcare associated


Reason influenza


HIV


Placed on Zosyn and Zyvox, and continue Tamiflu for influenza with superadded 

bacterial pneumonia


Continue HAART


Antibiotics as per ID.





ENDO:


Hypokalemia


Hypophosphatemia.


Hyponatremia -encourage oral intake of fluids.  Monitor BMP


Replace electrolytes as clinically indicated for electrolyte protocol


3/3.  Obtain stat labs, CBC with differential, BMP, magnesium and phosphorus.


3/4 potassium is severely depleted at 2.2, phosphorus is low at 1.7..  Will 

replace as per the ICU electrolyte protocol.  Monitor BMP.








Access


Utilized peripheral IVs





Prophylaxis


GI - Protonix


DVT - SCD/Lovenox subcutaneous














Discharge Planning


Patient with poor prognosis and severe respiratory distress.  


Case discussed with Omar- son and staff nurse


PAtient is DNR/DNI.





1645- d/w Hospice nurse- Jayashree- spoke with family- discharge to Hospice care 

center today





Problem Qualifiers





(1) Atrial fibrillation:  


Qualified Codes:  I48.91 - Unspecified atrial fibrillation


(2) Depression:  


Qualified Codes:  F32.9 - Major depressive disorder, single episode, unspecified








Lucila Oliver MD Mar 8, 2018 12:13
Subjective


Remarks


on Bi Pap


tachycardic


lethargic-





Objective


Vitals





Vital Signs








  Date Time  Temp Pulse Resp B/P (MAP) Pulse Ox O2 Delivery O2 Flow Rate FiO2


 


3/5/18 16:00 98.8 130 20 152/85 (107) 98   


 


3/5/18 16:00     98   50


 


3/5/18 14:00  129      


 


3/5/18 12:00 99.3 130 23 133/62 (85) 97   


 


3/5/18 12:00  132      


 


3/5/18 11:41     96   50


 


3/5/18 11:28   22     


 


3/5/18 10:00  135      


 


3/5/18 09:17  139  176/99    


 


3/5/18 08:00  130      


 


3/5/18 08:00 99.1 130 41 156/94 (114) 97   


 


3/5/18 07:36     98   50


 


3/5/18 07:36     98 BiPAP  50


 


3/5/18 07:00     98 Bi-Pap  50


 


3/5/18 06:00  126      


 


3/5/18 04:15     96   50


 


3/5/18 04:00 98.3 127 30 150/97 (114) 96   


 


3/5/18 04:00  127      


 


3/5/18 02:00  128      


 


3/5/18 02:00  128      


 


3/5/18 00:50     97   50


 


3/5/18 00:00 98.0 124 24 114/70 (85) 96   


 


3/4/18 23:13  130  105/74    


 


3/4/18 22:06     94 Bi-Pap  50


 


3/4/18 22:00  140      


 


3/4/18 21:54     95   50


 


3/4/18 20:08     96 Non-Rebreather 15.00 


 


3/4/18 20:00  144      


 


3/4/18 20:00 100.3 144 30 136/76 (96) 96   


 


3/4/18 19:23     96   50


 


3/4/18 19:00     93 Non-Rebreather 15.00 


 


3/4/18 19:00  136  171/102    


 


3/4/18 18:00  115      














I/O      


 


 3/4/18 3/4/18 3/4/18 3/5/18 3/5/18 3/5/18





 07:00 15:00 23:00 07:00 15:00 23:00


 


Intake Total 705 ml  555 ml 650 ml  


 


Output Total 1250 ml  1450 ml 1650 ml  


 


Balance -545 ml  -895 ml -1000 ml  


 


      


 


Intake Oral   250 ml   


 


IV Total 705 ml  305 ml 650 ml  


 


Output Urine Total 1250 ml  1450 ml 1650 ml  


 


# Bowel Movements   2   








Result Diagram:  


3/5/18 0425                                                                    

            3/5/18 0425





Imaging





Last Impressions








Chest X-Ray 3/4/18 0000 Signed





Impressions: 





 Service Date/Time:  Sunday, March 4, 2018 09:04 - CONCLUSION: The improvement 





 with less interstitial edema.      Lito Salomon MD  FACR


 


Liver Ultrasound 2/28/18 0000 Signed





Impressions: 





 Service Date/Time:  Wednesday, February 28, 2018 13:48 - CONCLUSION:  1. Mild 





 increased echotexture of the liver may indicate steatosis. 2. Cholelithiasis. 

   





  Yg Rodarte MD 


 


Chest CT 2/28/18 0000 Signed





Impressions: 





 Service Date/Time:  Wednesday, February 28, 2018 13:27 - CONCLUSION:  Patchy 





 airspace disease scattered consolidation both lungs.  No pleural effusion.    

  





 Lito Salomon MD  FACR








Objective Remarks


lethargic


pupils equal


Bi PAP in place


lungs- decrease breath sounds


tachycardic


abdomen= soft


extremities no edema


 L sided extremities- flaccid


Urinary Catheter:  Yes


Assessment to:  Continue


Warner insert reason:  Prolonged Immobilization





A/P


Problem List:  


(1) Acute hypoxemic respiratory failure


ICD Code:  J96.01 - Acute respiratory failure with hypoxia


Status:  Acute


(2) Acute exacerbation of chronic obstructive pulmonary disease (COPD)


ICD Code:  J44.1 - Chronic obstructive pulmonary disease with (acute) 

exacerbation


(3) Severe sepsis


ICD Code:  A41.9 - Sepsis, unspecified organism; R65.20 - Severe sepsis without 

septic shock


(4) Healthcare-associated pneumonia


ICD Code:  J18.9 - Pneumonia, unspecified organism


(5) Bilateral multilobar pneumonia


(6) Influenza


ICD Code:  J11.1 - Influenza due to unidentified influenza virus with other 

respiratory manifestations


(7) Acute kidney injury


ICD Code:  N17.9 - Acute kidney failure, unspecified


(8) Atrial fibrillation with RVR


ICD Code:  I48.91 - Unspecified atrial fibrillation


Status:  Acute


(9) Atrial fibrillation


ICD Code:  I48.91 - Unspecified atrial fibrillation


Status:  Acute


(10) Expressive aphasia


ICD Code:  R47.01 - Aphasia


Status:  Acute


(11) Depression


ICD Code:  F32.9 - Major depressive disorder, single episode, unspecified


Status:  Chronic


(12) Hypertension


ICD Code:  I10 - Essential (primary) hypertension


Status:  Chronic


(13) HLD (hyperlipidemia)


ICD Code:  E78.5 - Hyperlipidemia, unspecified


Status:  Chronic


(14) Left acute arterial ischemic stroke, MCA (middle cerebral artery)


ICD Code:  I63.512 - Cerebral infarction due to unspecified occlusion or 

stenosis of left middle cerebral artery


Status:  Acute


(15) History of diabetes mellitus


ICD Code:  Z86.39 - Personal history of other endocrine, nutritional and 

metabolic disease


Status:  Chronic


(16) Left carotid stenosis


ICD Code:  I65.22 - Occlusion and stenosis of left carotid artery


Status:  Acute


Assessment and Plan


Neuro/Psych:


History of left MCA stroke with right hemiparesis


Depression


Peripheral neuropathy


Encephalopathy


Continue aspirin/Plavix


PT/OT once stabilized respiratory wise


Hold as needed Xanax, Prozac, Neurontin, Ambien


3/3 suspect encephalopathy secondary to CO2 narcosis since patient is in 

respiratory distress on BiPAP.  I will obtain an ABG.  Continue to monitor 

neurological status.


3/4 encephalopathy likely secondary to metabolic encephalopathy due to 

hypernatremia.


Palliative care on board








CV: 


Atrial fibrillation with rapid ventricular response


CAD with History of non-STEMI


Dyslipidemia


Left carotid stenosis


History of hypertension


Chronic A. fib


 Cardizem infusion for rate control- continue . Start IV Lopressor 5 mg q 6


Continue Home medications Coreg and amiodarone, hold p.o. Cardizem, and losartan

- po meds not being given- lethargic


Continue aspirin Plavix


 2 D Echo. Echocardiogram in 2015 EF around 50%.


3/3 patient on A. fib with RVR.  Currently not on Cardizem infusion.  Heart 

rate in the high 130s.  Ordered EKG stat.  Will give Cardizem IV bolus and 

placed on Cardizem drip.  Will check cardiac enzymes, TSH free T4.


3/4 patient still with A. fib with RVR however better controlled.  Continue 

Cardizem drip.    TSH was slightly decreased at 0.119, however free T4 1.20. 





Acute hypoxemic respiratory failure


Multilobar pneumonia recent influenza


Acute COPD exacerbation


Off BiPAP


DuoNeb every 4 hours scheduled and as needed


Broad-spectrum antibiotics with Zosyn, Zyvox, Tamiflu


3/3 x-ray shows deterioration with decreasing interstitial changes in both lungs

, reviewed by me and likely consistent with acute pulmonary edema.  I will 

obtain an ABG stat, ordered Lasix 60 mg IV once and 40 mg IV twice daily, 125 

mg of IV Solu-Medrol and 60 g IV every 8 hours.  Discontinue IV fluids.  Order 

stat labs.  Discussed the case with Dr. Alcantar who states the patient DNR/DNI.  

Nurse is trying to get in touch with family or contact available.  I will 

change the BiPAP setting to 12/5.


3/4 respiratory failure is improving.  The patient is off BiPAP.  Continue IV 

Solu-Medrol which I will increase to 60 mg IV every 6 hours.  Continue Lasix 40 

mg IV twice daily.


Continue antibiotics as per ID.  Continue IV Zosyn.








GI: 


History of hepatitis C


History diverticulosis


Elevated liver enzymes


Advance PO as tolerated.


IV Famotidine


Check liver and gallbladder ultrasound





:  


Acute kidney injury


Warner will be placed for accurate I's and O's in a critically ill patient


IVF as above





Endo:  


Diabetes mellitus


Place on sliding scale insulin.  Accu-Cheks to maintain euglycemia


3.  For blood sugar severely elevated





Heme:


Monitor CBC daily.





ID:


Bilateral pneumonia healthcare associated


Reason influenza


HIV


Placed on Zosyn and Zyvox, and continue Tamiflu for influenza with superadded 

bacterial pneumonia


Continue HAART


Antibiotics as per ID.





ENDO:


Hypokalemia


Hypophosphatemia.


Hyponatremia -encourage oral intake of fluids.  Monitor BMP


Replace electrolytes as clinically indicated for electrolyte protocol


3/3.  Obtain stat labs, CBC with differential, BMP, magnesium and phosphorus.


3/4 potassium is severely depleted at 2.2, phosphorus is low at 1.7..  Will 

replace as per the ICU electrolyte protocol.  Monitor BMP.








Access


Utilized peripheral IVs





Prophylaxis


GI - Protonix


DVT - SCD/Lovenox subcutaneous


Discharge Planning


Patient with poor prognosis and severe respiratory distress.  


Case discussed with Omar- son and staff nurse


PAtient is DNR/DNI.





Problem Qualifiers





(1) Atrial fibrillation:  


Qualified Codes:  I48.91 - Unspecified atrial fibrillation


(2) Depression:  


Qualified Codes:  F32.9 - Major depressive disorder, single episode, unspecified








Lucila Oliver MD Mar 5, 2018 17:11
Subjective


Remarks


rate controlled on Cardizem drip


opened eyes to call of her name but otherwise no meaningful interaction





Objective


Vitals





Vital Signs








  Date Time  Temp Pulse Resp B/P (MAP) Pulse Ox O2 Delivery O2 Flow Rate FiO2


 


3/7/18 12:26  103  174/88    


 


3/7/18 12:00  101      


 


3/7/18 10:00  103      


 


3/7/18 08:00 97.2 95 20 150/89 (109) 96   


 


3/7/18 08:00  95      


 


3/7/18 07:17     96 Nasal Cannula 4.00 


 


3/7/18 07:00     97 Nasal Cannula 4.00 


 


3/7/18 06:00  87      


 


3/7/18 04:00 99.0 103 19 137/84 (101) 95   


 


3/7/18 04:00  103      


 


3/7/18 03:59  103  150/80    


 


3/7/18 02:00  95      


 


3/7/18 00:00 99.2 103 16 177/86 (116) 96   


 


3/7/18 00:00  95      


 


3/6/18 23:51     96 Nasal Cannula 4.00 


 


3/6/18 22:00  95      


 


3/6/18 20:00 98.9 106 24 148/82 (104) 94   


 


3/6/18 20:00  106      


 


3/6/18 19:30     94 Nasal Cannula 3.00 


 


3/6/18 19:00     94 Nasal Cannula 4.00 


 


3/6/18 18:31  110  149/95    


 


3/6/18 18:28   26     


 


3/6/18 18:00  116      


 


3/6/18 16:00  120      


 


3/6/18 16:00 97.3 120 40 142/94 [phone]) 93   


 


3/6/18 14:00  130      














I/O      


 


 3/6/18 3/6/18 3/6/18 3/7/18 3/7/18 3/7/18





 07:00 15:00 23:00 07:00 15:00 23:00


 


Intake Total 574 ml 175 ml 425 ml   


 


Output Total 1200 ml  950 ml 600 ml  


 


Balance -626 ml 175 ml -525 ml -600 ml  


 


      


 


Intake Oral   0 ml   


 


IV Total 574 ml 175 ml 425 ml   


 


Output Urine Total 1200 ml  950 ml 600 ml  


 


# Bowel Movements 0  0   








Result Diagram:  


3/6/18 1817                                                                    

            3/7/18 0941





Imaging





Last Impressions








Chest X-Ray 3/4/18 0000 Signed





Impressions: 





 Service Date/Time:  Sunday, March 4, 2018 09:04 - CONCLUSION: The improvement 





 with less interstitial edema.      Lito Salomon MD  FACR


 


Liver Ultrasound 2/28/18 0000 Signed





Impressions: 





 Service Date/Time:  Wednesday, February 28, 2018 13:48 - CONCLUSION:  1. Mild 





 increased echotexture of the liver may indicate steatosis. 2. Cholelithiasis. 

   





  Yg Rodarte MD 


 


Chest CT 2/28/18 0000 Signed





Impressions: 





 Service Date/Time:  Wednesday, February 28, 2018 13:27 - CONCLUSION:  Patchy 





 airspace disease scattered consolidation both lungs.  No pleural effusion.    

  





 Lito Salomon MD  FACR








Objective Remarks


lethargic


pupils equal


lungs- decrease breath sounds


rate controlled- on Cardizem drip


abdomen= soft


extremities no edema


L sided extremities- flaccid





A/P


Problem List:  


(1) Acute hypoxemic respiratory failure


ICD Code:  J96.01 - Acute respiratory failure with hypoxia


Status:  Acute


(2) Acute exacerbation of chronic obstructive pulmonary disease (COPD)


ICD Code:  J44.1 - Chronic obstructive pulmonary disease with (acute) 

exacerbation


(3) Severe sepsis


ICD Code:  A41.9 - Sepsis, unspecified organism; R65.20 - Severe sepsis without 

septic shock


(4) Healthcare-associated pneumonia


ICD Code:  J18.9 - Pneumonia, unspecified organism


(5) Bilateral multilobar pneumonia


(6) Influenza


ICD Code:  J11.1 - Influenza due to unidentified influenza virus with other 

respiratory manifestations


(7) Acute kidney injury


ICD Code:  N17.9 - Acute kidney failure, unspecified


(8) Atrial fibrillation with RVR


ICD Code:  I48.91 - Unspecified atrial fibrillation


Status:  Acute


(9) Atrial fibrillation


ICD Code:  I48.91 - Unspecified atrial fibrillation


Status:  Acute


(10) Expressive aphasia


ICD Code:  R47.01 - Aphasia


Status:  Acute


(11) Depression


ICD Code:  F32.9 - Major depressive disorder, single episode, unspecified


Status:  Chronic


(12) Hypertension


ICD Code:  I10 - Essential (primary) hypertension


Status:  Chronic


(13) HLD (hyperlipidemia)


ICD Code:  E78.5 - Hyperlipidemia, unspecified


Status:  Chronic


(14) Left acute arterial ischemic stroke, MCA (middle cerebral artery)


ICD Code:  I63.512 - Cerebral infarction due to unspecified occlusion or 

stenosis of left middle cerebral artery


Status:  Acute


(15) History of diabetes mellitus


ICD Code:  Z86.39 - Personal history of other endocrine, nutritional and 

metabolic disease


Status:  Chronic


(16) Left carotid stenosis


ICD Code:  I65.22 - Occlusion and stenosis of left carotid artery


Status:  Acute


Assessment and Plan


Neuro/Psych:


History of left MCA stroke with right hemiparesis


Depression


Peripheral neuropathy


Encephalopathy


Continue aspirin/Plavix


PT/OT once stabilized respiratory wise


Hold as needed Xanax, Prozac, Neurontin, Ambien


3/3 suspect encephalopathy secondary to CO2 narcosis since patient is in 

respiratory distress on BiPAP.  I will obtain an ABG.  Continue to monitor 

neurological status.


3/4 encephalopathy likely secondary to metabolic encephalopathy due to 

hypernatremia.


Palliative care on board








CV: 


Atrial fibrillation with rapid ventricular response


CAD with History of non-STEMI


Dyslipidemia


Left carotid stenosis


History of hypertension


Chronic A. fib


 Cardizem infusion for rate control- continue . Start IV Lopressor 5 mg q 6


Continue Home medications Coreg and amiodarone, hold p.o. Cardizem, and losartan

- po meds not being given- lethargic


Continue aspirin Plavix


 2 D Echo. Echocardiogram in 2015 EF around 50%.


3/3 patient on A. fib with RVR.  Currently not on Cardizem infusion.  Heart 

rate in the high 130s.  Ordered EKG stat.  Will give Cardizem IV bolus and 

placed on Cardizem drip.  Will check cardiac enzymes, TSH free T4.


3/4 patient still with A. fib with RVR however better controlled.  Continue 

Cardizem drip.    TSH was slightly decreased at 0.119, however free T4 1.20. 





Acute hypoxemic respiratory failure


Multilobar pneumonia recent influenza


Acute COPD exacerbation


Off BiPAP


DuoNeb every 4 hours scheduled and as needed


Broad-spectrum antibiotics with Zosyn, Zyvox, Tamiflu


3/3 x-ray shows deterioration with decreasing interstitial changes in both lungs

, reviewed by me and likely consistent with acute pulmonary edema.  I will 

obtain an ABG stat, ordered Lasix 60 mg IV once and 40 mg IV twice daily, 125 

mg of IV Solu-Medrol and 60 g IV every 8 hours.  Discontinue IV fluids.  Order 

stat labs.  Discussed the case with Dr. Alcantar who states the patient DNR/DNI.  

Nurse is trying to get in touch with family or contact available.  I will 

change the BiPAP setting to 12/5.


3/4 respiratory failure is improving.  The patient is off BiPAP.  Continue IV 

Solu-Medrol which I will increase to 60 mg IV every 6 hours.  Continue Lasix 40 

mg IV twice daily.


Continue antibiotics as per ID.  Continue IV Zosyn.








GI: 


History of hepatitis C


History diverticulosis


Elevated liver enzymes


Advance PO as tolerated.


IV Famotidine


Check liver and gallbladder ultrasound





:  


Acute kidney injury


Warner will be placed for accurate I's and O's in a critically ill patient


IVF as above





Endo:  


Diabetes mellitus


Place on sliding scale insulin.  Accu-Cheks to maintain euglycemia


3.  For blood sugar severely elevated





Heme:


Monitor CBC daily.





ID:


Bilateral pneumonia healthcare associated


Reason influenza


HIV


Placed on Zosyn and Zyvox, and continue Tamiflu for influenza with superadded 

bacterial pneumonia


Continue HAART


Antibiotics as per ID.





ENDO:


Hypokalemia


Hypophosphatemia.


Hyponatremia -encourage oral intake of fluids.  Monitor BMP


Replace electrolytes as clinically indicated for electrolyte protocol


3/3.  Obtain stat labs, CBC with differential, BMP, magnesium and phosphorus.


3/4 potassium is severely depleted at 2.2, phosphorus is low at 1.7..  Will 

replace as per the ICU electrolyte protocol.  Monitor BMP.








Access


Utilized peripheral IVs





Prophylaxis


GI - Protonix


DVT - SCD/Lovenox subcutaneous


Discharge Planning


Patient with poor prognosis and severe respiratory distress.  


Case discussed with Omar- son and staff nurse


PAtient is DNR/DNI.





transition to hospice


will discuss with family


[facility]





Problem Qualifiers





(1) Atrial fibrillation:  


Qualified Codes:  I48.91 - Unspecified atrial fibrillation


(2) Depression:  


Qualified Codes:  F32.9 - Major depressive disorder, single episode, unspecified








Lucila Oliver MD Mar 7, 2018 13:07
Reports continued poor sleep; states he feels "restless." No pain, SOB, cough, CP.  He notes depressed mood, but feels it is improving.  No new complaints.